# Patient Record
Sex: FEMALE | Race: WHITE | NOT HISPANIC OR LATINO | Employment: PART TIME | URBAN - METROPOLITAN AREA
[De-identification: names, ages, dates, MRNs, and addresses within clinical notes are randomized per-mention and may not be internally consistent; named-entity substitution may affect disease eponyms.]

---

## 2017-04-04 ENCOUNTER — TELEPHONE (OUTPATIENT)
Dept: URGENT CARE | Facility: CLINIC | Age: 62
End: 2017-04-04

## 2017-04-04 DIAGNOSIS — R05.9 COUGH: Primary | ICD-10-CM

## 2017-04-04 RX ORDER — DEXTROMETHORPHAN HYDROBROMIDE AND PROMETHAZINE HYDROCHLORIDE 15; 6.25 MG/5ML; MG/5ML
5 SYRUP ORAL 4 TIMES DAILY PRN
Qty: 60 ML | Refills: 0 | Status: SHIPPED | OUTPATIENT
Start: 2017-04-04 | End: 2018-03-31

## 2018-06-01 ENCOUNTER — TRANSCRIBE ORDERS (OUTPATIENT)
Dept: ADMINISTRATIVE | Facility: HOSPITAL | Age: 63
End: 2018-06-01

## 2018-06-01 DIAGNOSIS — Z78.0 POSTMENOPAUSAL: Primary | ICD-10-CM

## 2018-06-01 DIAGNOSIS — Z12.31 VISIT FOR SCREENING MAMMOGRAM: ICD-10-CM

## 2018-06-27 ENCOUNTER — OFFICE VISIT (OUTPATIENT)
Dept: PULMONOLOGY | Facility: CLINIC | Age: 63
End: 2018-06-27

## 2018-06-27 VITALS
DIASTOLIC BLOOD PRESSURE: 80 MMHG | SYSTOLIC BLOOD PRESSURE: 130 MMHG | BODY MASS INDEX: 44.56 KG/M2 | OXYGEN SATURATION: 96 % | HEART RATE: 90 BPM | TEMPERATURE: 98.4 F | WEIGHT: 236 LBS | HEIGHT: 61 IN

## 2018-06-27 DIAGNOSIS — R06.09 DYSPNEA ON EXERTION: ICD-10-CM

## 2018-06-27 DIAGNOSIS — J43.2 CENTRILOBULAR EMPHYSEMA (HCC): ICD-10-CM

## 2018-06-27 DIAGNOSIS — G47.33 OSA ON CPAP: ICD-10-CM

## 2018-06-27 DIAGNOSIS — R06.02 SOB (SHORTNESS OF BREATH): Primary | ICD-10-CM

## 2018-06-27 DIAGNOSIS — Z87.891 EX-SMOKER: ICD-10-CM

## 2018-06-27 DIAGNOSIS — Z99.89 OSA ON CPAP: ICD-10-CM

## 2018-06-27 DIAGNOSIS — R91.1 SOLITARY PULMONARY NODULE: ICD-10-CM

## 2018-06-27 PROBLEM — R06.00 DYSPNEA: Status: ACTIVE | Noted: 2018-05-23

## 2018-06-27 PROBLEM — J30.1 ALLERGIC RHINITIS DUE TO POLLEN: Status: ACTIVE | Noted: 2018-05-23

## 2018-06-27 PROCEDURE — 99244 OFF/OP CNSLTJ NEW/EST MOD 40: CPT | Performed by: INTERNAL MEDICINE

## 2018-06-27 PROCEDURE — 94729 DIFFUSING CAPACITY: CPT | Performed by: INTERNAL MEDICINE

## 2018-06-27 PROCEDURE — 94060 EVALUATION OF WHEEZING: CPT | Performed by: INTERNAL MEDICINE

## 2018-06-27 PROCEDURE — 94726 PLETHYSMOGRAPHY LUNG VOLUMES: CPT | Performed by: INTERNAL MEDICINE

## 2018-06-27 RX ORDER — FLUTICASONE PROPIONATE 50 MCG
SPRAY, SUSPENSION (ML) NASAL DAILY
COMMUNITY
Start: 2018-05-23 | End: 2019-03-22 | Stop reason: SDDI

## 2018-06-27 RX ORDER — IBUPROFEN 200 MG
TABLET ORAL
COMMUNITY
Start: 2018-05-23 | End: 2019-03-22 | Stop reason: SDDI

## 2018-06-27 RX ORDER — ALBUTEROL SULFATE 90 UG/1
4 AEROSOL, METERED RESPIRATORY (INHALATION) ONCE
Status: COMPLETED | OUTPATIENT
Start: 2018-06-27 | End: 2018-06-27

## 2018-06-27 RX ORDER — ALBUTEROL SULFATE 90 UG/1
AEROSOL, METERED RESPIRATORY (INHALATION)
COMMUNITY
Start: 2015-12-08 | End: 2018-06-27

## 2018-06-27 RX ADMIN — ALBUTEROL SULFATE 4 PUFF: 90 AEROSOL, METERED RESPIRATORY (INHALATION) at 08:20

## 2018-06-27 NOTE — PROGRESS NOTES
Sorts Subjective:     Chief Complaint:   Chief Complaint   Patient presents with   • Lung Nodule     per Dr Mendez       HPI:    Lizabeth Rangel is a 62 y.o. female seen in consultation at the request of Dr. Mendez for evaluation of a pulmonary nodule    She underwent a low dose screening CAT scan at [facility].  A 6 mm nodule was found in the lingula.  This was described as groundglass.  Some mild centrilobular emphysematous changes were also noted.    She has a remote smoking history.  She quit in 1989 was a heavy smoker prior to that.  She does have some mild chronic dyspnea on exertion.  She is trying to lose weight and exercise.  She does have obstructive sleep apnea and recently has resumed her CPAP therapy.  She has a download on her phone which shows a normal AHI and good mask fit.    Historical details:    General symptoms:  - intentional weight loss of 15 lbs    Exercise tolerance:  - shortness of breath at 3 flight(s) of stairs    Chest symptoms:  - mild cough  - cough is dry    Sputum:  - normal    Upper airway:  - chronic congestion    Reflux symptoms:  - no reflux symptoms    Recent imaging:  - CT of chest:  6 mm lingular nodule    Current medications are:   Current Outpatient Prescriptions:   •  fluticasone (FLONASE) 50 MCG/ACT nasal spray, into each nostril Daily., Disp: , Rfl:   •  ibuprofen (ADVIL,MOTRIN) 200 MG tablet, Take  by mouth., Disp: , Rfl:   No current facility-administered medications for this visit. .      The patient's relevant past medical, surgical, family and social history were reviewed and updated in Epic as appropriate.     ROS:    Review of Systems  ROS documented in patient questionnaire ×12 systems.  Reviewed with patient.  Otherwise negative except as noted in HPI.    Objective:    Physical Exam   Constitutional: She is oriented to person, place, and time. She appears well-developed and well-nourished.   HENT:   Head: Normocephalic and atraumatic.   Nose: Nose  normal.   Mouth/Throat: Oropharynx is clear and moist. No oropharyngeal exudate.   Class IV airway   Eyes: Conjunctivae are normal. No scleral icterus.   Neck: No tracheal deviation present. No thyromegaly present.   Cardiovascular: Normal rate and regular rhythm.  Exam reveals no gallop and no friction rub.    No murmur heard.  Pulmonary/Chest: Effort normal. No respiratory distress. She has no wheezes. She has no rales.   Musculoskeletal: She exhibits no edema or deformity.   Neurological: She is alert and oriented to person, place, and time.   Skin: Skin is warm and dry. No rash noted.   Psychiatric: She has a normal mood and affect. Her behavior is normal.   Nursing note and vitals reviewed.      Diagnostics:     PFT:  - mild airway obstruction  - normal lung volumes  - normal diffusion    CXR:  - none    CT as above    Assessment:    Problem List Items Addressed This Visit        Pulmonary Problems    CAREN on CPAP    Solitary pulmonary nodule    Overview     6 mm GG lingula 6/2018         Centrilobular emphysema    Dyspnea       Other    Ex-smoker      Other Visit Diagnoses     SOB (shortness of breath)    -  Primary    Relevant Medications    albuterol (PROVENTIL HFA;VENTOLIN HFA) inhaler 4 puff (Completed)    Other Relevant Orders    Pulmonary Function Test (Completed)          62-year-old female with a 6 mm groundglass nodule in the lingula which is an incidental finding.  Her risk of cancer is low.  Therefore by standard SPN criteria a follow-up CAT scan in 12 months would be recommended.  I discussed this with the patient in detail and showed her her CAT scan.  She understands the absolute necessity of follow-up to mitigate the low but potential risk of lung cancer    She has mild obstructive airways disease and mild centrilobular emphysema by CT scan.  No inhaled therapy needed as she is generally asymptomatic.  I think that weight loss and exercise would be important for her to improve her dyspnea on  exertion.    From the standpoint of her sleep apnea she is well treated based upon her download [email or web address]    Plan:     Follow-up CT scan in  1 year  As above.  Clinic follow-up  1 year      Signed by  Gary Hamilton MD

## 2018-07-03 ENCOUNTER — HOSPITAL ENCOUNTER (OUTPATIENT)
Dept: BONE DENSITY | Facility: HOSPITAL | Age: 63
Discharge: HOME | End: 2018-07-03

## 2018-07-03 ENCOUNTER — APPOINTMENT (OUTPATIENT)
Dept: OTHER | Facility: HOSPITAL | Age: 63
End: 2018-07-03

## 2018-07-03 ENCOUNTER — HOSPITAL ENCOUNTER (OUTPATIENT)
Dept: MAMMOGRAPHY | Facility: HOSPITAL | Age: 63
Discharge: HOME | End: 2018-07-03
Admitting: NURSE PRACTITIONER

## 2018-07-03 DIAGNOSIS — Z12.31 VISIT FOR SCREENING MAMMOGRAM: ICD-10-CM

## 2018-07-03 DIAGNOSIS — Z78.0 POSTMENOPAUSAL: ICD-10-CM

## 2018-07-03 PROCEDURE — 77063 BREAST TOMOSYNTHESIS BI: CPT

## 2018-07-03 PROCEDURE — 77067 SCR MAMMO BI INCL CAD: CPT

## 2018-07-03 PROCEDURE — 77063 BREAST TOMOSYNTHESIS BI: CPT | Performed by: RADIOLOGY

## 2018-07-03 PROCEDURE — 77067 SCR MAMMO BI INCL CAD: CPT | Performed by: RADIOLOGY

## 2018-07-03 PROCEDURE — 77080 DXA BONE DENSITY AXIAL: CPT

## 2018-07-23 ENCOUNTER — HOSPITAL ENCOUNTER (OUTPATIENT)
Dept: MAMMOGRAPHY | Facility: HOSPITAL | Age: 63
Discharge: HOME | End: 2018-07-23
Admitting: RADIOLOGY

## 2018-07-23 ENCOUNTER — HOSPITAL ENCOUNTER (OUTPATIENT)
Dept: ULTRASOUND IMAGING | Facility: HOSPITAL | Age: 63
Discharge: HOME | End: 2018-07-23

## 2018-07-23 DIAGNOSIS — R92.8 ABNORMAL MAMMOGRAM: ICD-10-CM

## 2018-07-23 PROCEDURE — G0279 TOMOSYNTHESIS, MAMMO: HCPCS

## 2018-07-23 PROCEDURE — 77065 DX MAMMO INCL CAD UNI: CPT

## 2018-07-23 PROCEDURE — 76642 ULTRASOUND BREAST LIMITED: CPT | Performed by: RADIOLOGY

## 2018-07-23 PROCEDURE — 77061 BREAST TOMOSYNTHESIS UNI: CPT | Performed by: RADIOLOGY

## 2018-07-23 PROCEDURE — 76642 ULTRASOUND BREAST LIMITED: CPT

## 2018-07-23 PROCEDURE — 77065 DX MAMMO INCL CAD UNI: CPT | Performed by: RADIOLOGY

## 2018-11-08 ENCOUNTER — TRANSCRIBE ORDERS (OUTPATIENT)
Dept: ADMINISTRATIVE | Facility: HOSPITAL | Age: 63
End: 2018-11-08

## 2018-11-08 DIAGNOSIS — R10.9 ABDOMINAL PAIN, UNSPECIFIED ABDOMINAL LOCATION: Primary | ICD-10-CM

## 2018-11-15 ENCOUNTER — HOSPITAL ENCOUNTER (OUTPATIENT)
Dept: MRI IMAGING | Facility: HOSPITAL | Age: 63
End: 2018-11-15

## 2018-11-26 ENCOUNTER — APPOINTMENT (OUTPATIENT)
Dept: MRI IMAGING | Facility: HOSPITAL | Age: 63
End: 2018-11-26

## 2018-12-10 ENCOUNTER — APPOINTMENT (OUTPATIENT)
Dept: MRI IMAGING | Facility: HOSPITAL | Age: 63
End: 2018-12-10

## 2018-12-18 ENCOUNTER — APPOINTMENT (OUTPATIENT)
Dept: MRI IMAGING | Facility: HOSPITAL | Age: 63
End: 2018-12-18

## 2018-12-19 ENCOUNTER — HOSPITAL ENCOUNTER (OUTPATIENT)
Dept: MRI IMAGING | Facility: HOSPITAL | Age: 63
Discharge: HOME | End: 2018-12-19
Admitting: INTERNAL MEDICINE

## 2018-12-19 DIAGNOSIS — R10.9 ABDOMINAL PAIN, UNSPECIFIED ABDOMINAL LOCATION: ICD-10-CM

## 2018-12-19 LAB — CREAT BLDA-MCNC: 0.7 MG/DL (ref 0.6–1.3)

## 2018-12-19 PROCEDURE — 74183 MRI ABD W/O CNTR FLWD CNTR: CPT

## 2018-12-19 PROCEDURE — 82565 ASSAY OF CREATININE: CPT

## 2018-12-19 PROCEDURE — 0 GADOBENATE DIMEGLUMINE 529 MG/ML SOLUTION: Performed by: INTERNAL MEDICINE

## 2018-12-19 PROCEDURE — A9577 INJ MULTIHANCE: HCPCS | Performed by: INTERNAL MEDICINE

## 2018-12-19 RX ADMIN — GADOBENATE DIMEGLUMINE 15 ML: 529 INJECTION, SOLUTION INTRAVENOUS at 09:06

## 2019-03-22 ENCOUNTER — OFFICE VISIT (OUTPATIENT)
Dept: INTERNAL MEDICINE | Facility: CLINIC | Age: 64
End: 2019-03-22

## 2019-03-22 VITALS
BODY MASS INDEX: 46.07 KG/M2 | DIASTOLIC BLOOD PRESSURE: 68 MMHG | WEIGHT: 244 LBS | SYSTOLIC BLOOD PRESSURE: 112 MMHG | HEART RATE: 104 BPM | TEMPERATURE: 99 F | HEIGHT: 61 IN

## 2019-03-22 DIAGNOSIS — R50.9 FEVER, UNSPECIFIED FEVER CAUSE: Primary | ICD-10-CM

## 2019-03-22 DIAGNOSIS — J06.9 ACUTE URI: ICD-10-CM

## 2019-03-22 LAB
EXPIRATION DATE: NORMAL
EXPIRATION DATE: NORMAL
FLUAV AG NPH QL: NEGATIVE
FLUBV AG NPH QL: NEGATIVE
INTERNAL CONTROL: NORMAL
INTERNAL CONTROL: NORMAL
Lab: NORMAL
Lab: NORMAL
S PYO AG THROAT QL: NEGATIVE

## 2019-03-22 PROCEDURE — 99214 OFFICE O/P EST MOD 30 MIN: CPT | Performed by: NURSE PRACTITIONER

## 2019-03-22 PROCEDURE — 87880 STREP A ASSAY W/OPTIC: CPT | Performed by: NURSE PRACTITIONER

## 2019-03-22 PROCEDURE — 87804 INFLUENZA ASSAY W/OPTIC: CPT | Performed by: NURSE PRACTITIONER

## 2019-03-22 RX ORDER — PROMETHAZINE HYDROCHLORIDE AND CODEINE PHOSPHATE 6.25; 1 MG/5ML; MG/5ML
5 SYRUP ORAL EVERY 4 HOURS PRN
Qty: 118 ML | Refills: 0 | Status: SHIPPED | OUTPATIENT
Start: 2019-03-22 | End: 2019-03-29 | Stop reason: SDUPTHER

## 2019-03-22 RX ORDER — ALBUTEROL SULFATE 90 UG/1
2 AEROSOL, METERED RESPIRATORY (INHALATION) EVERY 4 HOURS PRN
Qty: 1 INHALER | Refills: 0 | Status: SHIPPED | OUTPATIENT
Start: 2019-03-22 | End: 2019-03-29

## 2019-03-22 RX ORDER — PROMETHAZINE HYDROCHLORIDE AND PHENYLEPHRINE HYDROCHLORIDE 6.25; 5 MG/5ML; MG/5ML
5 SYRUP ORAL EVERY 4 HOURS PRN
Qty: 118 ML | Refills: 0 | Status: SHIPPED | OUTPATIENT
Start: 2019-03-22 | End: 2019-03-22 | Stop reason: ALTCHOICE

## 2019-03-22 RX ORDER — AZITHROMYCIN 250 MG/1
TABLET, FILM COATED ORAL
Qty: 6 TABLET | Refills: 0 | Status: SHIPPED | OUTPATIENT
Start: 2019-03-22 | End: 2019-03-29

## 2019-03-22 NOTE — PROGRESS NOTES
Lizabeth Rangel  1955  [id number]  Patient Care Team:  Jean Mendez MD as PCP - General (Internal Medicine)    Lizabeth Rangel is a pleasant 63 y.o. female who presents for evaluation of Sore Throat (x 1 day ) and Cough (productive cough )      Chief Complaint   Patient presents with   • Sore Throat     x 1 day    • Cough     productive cough        HPI:   1 day hx productive cough, fever, sore throat.  Did take Advil 600 mg last night, up all night coughing.  Also has hx of recurrent strep.  Office mates ill last week.  No meds today.  Reports wheezing last night but none today.    Past Medical History:   Diagnosis Date   • Sleep apnea        Past Surgical History:   Procedure Laterality Date   • ANKLE ARTHROSCOPY     • CATARACT EXTRACTION     • CHOLECYSTECTOMY     • COLON RESECTION         Family History   Problem Relation Age of Onset   • No Known Problems Mother    • Alcohol abuse Father    • Breast cancer Maternal Aunt 30   • Ovarian cancer Neg Hx        Social History     Tobacco Use   Smoking Status Former Smoker   • Last attempt to quit: 1989   • Years since quittin.7   Smokeless Tobacco Never Used       Allergies   Allergen Reactions   • Thimerosal Itching       Review of Systems   Constitutional: Positive for chills. Negative for fatigue and fever.   HENT: Positive for congestion. Negative for ear pain and sinus pressure.    Respiratory: Positive for cough and wheezing. Negative for chest tightness and shortness of breath.    Cardiovascular: Negative for chest pain and palpitations.   Gastrointestinal: Negative for abdominal pain, blood in stool and constipation.   Skin: Negative for color change.   Allergic/Immunologic: Negative for environmental allergies.   Neurological: Negative for dizziness, speech difficulty and headache.   Psychiatric/Behavioral: Negative for decreased concentration. The patient is not nervous/anxious.        Vitals:    19 1504   BP: 112/68   BP Location:  "Left arm   Patient Position: Sitting   Cuff Size: Large Adult   Pulse: 104   Temp: 99 °F (37.2 °C)   TempSrc: Temporal   Weight: 111 kg (244 lb)   Height: 154.9 cm (60.98\")   PainSc:   9   PainLoc: Throat         Current Outpatient Medications:   •  albuterol sulfate  (90 Base) MCG/ACT inhaler, Inhale 2 puffs Every 4 (Four) Hours As Needed for Wheezing., Disp: 1 inhaler, Rfl: 0  •  azithromycin (ZITHROMAX Z-BULL) 250 MG tablet, Take 2 tablets the first day, then 1 tablet daily for 4 days., Disp: 6 tablet, Rfl: 0  •  promethazine-codeine (PHENERGAN with CODEINE) 6.25-10 MG/5ML syrup, Take 5 mL by mouth Every 4 (Four) Hours As Needed for Cough., Disp: 118 mL, Rfl: 0    Physical Exam   Constitutional: She appears well-developed and well-nourished.   HENT:   Head: Normocephalic and atraumatic.   Right Ear: External ear normal.   Left Ear: External ear normal.   Mouth/Throat: Oropharynx is clear and moist.   Eyes: Conjunctivae and EOM are normal.   Neck: Normal range of motion. Neck supple.   Cardiovascular: Normal rate, regular rhythm and normal heart sounds.   Pulmonary/Chest: Effort normal and breath sounds normal.   Abdominal: Soft. Bowel sounds are normal.   Musculoskeletal: Normal range of motion.   Lymphadenopathy:     She has no cervical adenopathy.   Neurological: She is alert.   Skin: Skin is warm and dry.   Psychiatric: She has a normal mood and affect. Her behavior is normal. Thought content normal.       Results Review:    None    Procedures    Assessment/Plan:    Problem List Items Addressed This Visit     None      Visit Diagnoses     Fever, unspecified fever cause    -  Primary    Relevant Orders    POC Rapid Strep A (Completed)    POC Influenza A / B (Completed)    Acute URI        Relevant Medications    azithromycin (ZITHROMAX Z-BULL) 250 MG tablet    albuterol sulfate  (90 Base) MCG/ACT inhaler    promethazine-codeine (PHENERGAN with CODEINE) 6.25-10 MG/5ML syrup          Plan of care " reviewed with patient at the conclusion of today's visit. Education was provided regarding diagnosis, management and any prescribed or recommended OTC medications.  Patient verbalizes understanding of and agreement with management plan.    Return if symptoms worsen or fail to improve.    *Note that portions of this note were completed with a voice recognition program.  Efforts were made to edit the dictation but occasionally words are transcribed.    VALDEZ Rai

## 2019-03-22 NOTE — PATIENT INSTRUCTIONS
zpack to pharmacy if not better or if worse in 2-3 days    Use albuterol 2 puffs every 4-6 hours as needed for wheezing, shortness of breath, chest tightness or excessive coughing.    RX cough syrup to pharmacy

## 2019-03-25 PROBLEM — R91.1 LUNG NODULE: Status: ACTIVE | Noted: 2019-03-25

## 2019-03-25 PROBLEM — L98.9 SKIN LESION OF SCALP: Status: ACTIVE | Noted: 2019-03-25

## 2019-03-25 PROBLEM — N60.09 BREAST CYST: Status: ACTIVE | Noted: 2019-03-25

## 2019-03-25 PROBLEM — E78.2 MIXED HYPERLIPIDEMIA: Status: ACTIVE | Noted: 2019-03-25

## 2019-03-25 PROBLEM — E53.8 B12 DEFICIENCY: Status: ACTIVE | Noted: 2019-03-25

## 2019-03-25 PROBLEM — Z78.0 POST-MENOPAUSAL: Status: ACTIVE | Noted: 2019-03-25

## 2019-03-25 PROBLEM — M85.80 OSTEOPENIA: Status: ACTIVE | Noted: 2019-03-25

## 2019-03-25 PROBLEM — E55.9 VITAMIN D DEFICIENCY: Status: ACTIVE | Noted: 2019-03-25

## 2019-03-29 ENCOUNTER — HOSPITAL ENCOUNTER (OUTPATIENT)
Dept: GENERAL RADIOLOGY | Facility: HOSPITAL | Age: 64
Discharge: HOME | End: 2019-03-29
Admitting: NURSE PRACTITIONER

## 2019-03-29 ENCOUNTER — OFFICE VISIT (OUTPATIENT)
Dept: INTERNAL MEDICINE | Facility: CLINIC | Age: 64
End: 2019-03-29

## 2019-03-29 VITALS
DIASTOLIC BLOOD PRESSURE: 74 MMHG | HEIGHT: 61 IN | BODY MASS INDEX: 45.88 KG/M2 | HEART RATE: 88 BPM | WEIGHT: 243 LBS | TEMPERATURE: 97.6 F | SYSTOLIC BLOOD PRESSURE: 114 MMHG

## 2019-03-29 DIAGNOSIS — J06.9 ACUTE URI: ICD-10-CM

## 2019-03-29 DIAGNOSIS — R05.9 COUGH: ICD-10-CM

## 2019-03-29 DIAGNOSIS — R07.81 RIB PAIN ON RIGHT SIDE: ICD-10-CM

## 2019-03-29 DIAGNOSIS — R07.81 RIB PAIN ON RIGHT SIDE: Primary | ICD-10-CM

## 2019-03-29 PROBLEM — E66.01 MORBID OBESITY WITH BMI OF 45.0-49.9, ADULT (HCC): Status: ACTIVE | Noted: 2019-03-29

## 2019-03-29 PROCEDURE — 71046 X-RAY EXAM CHEST 2 VIEWS: CPT

## 2019-03-29 PROCEDURE — 99213 OFFICE O/P EST LOW 20 MIN: CPT | Performed by: NURSE PRACTITIONER

## 2019-03-29 RX ORDER — PROMETHAZINE HYDROCHLORIDE AND CODEINE PHOSPHATE 6.25; 1 MG/5ML; MG/5ML
5 SYRUP ORAL EVERY 4 HOURS PRN
Qty: 118 ML | Refills: 0 | Status: SHIPPED | OUTPATIENT
Start: 2019-03-29 | End: 2019-03-29

## 2019-03-29 NOTE — PATIENT INSTRUCTIONS
Chest Wall Pain  Chest wall pain is pain in or around the bones and muscles of your chest. Sometimes, an injury causes this pain. Sometimes, the cause may not be known. This pain may take several weeks or longer to get better.  Follow these instructions at home:  Pay attention to any changes in your symptoms. Take these actions to help with your pain:  · Rest as told by your doctor.  · Avoid activities that cause pain. Try not to use your chest, belly (abdominal), or side muscles to lift heavy things.  · If directed, apply ice to the painful area:  ? Put ice in a plastic bag.  ? Place a towel between your skin and the bag.  ? Leave the ice on for 20 minutes, 2-3 times per day.  · Take over-the-counter and prescription medicines only as told by your doctor.  · Do not use tobacco products, including cigarettes, chewing tobacco, and e-cigarettes. If you need help quitting, ask your doctor.  · Keep all follow-up visits as told by your doctor. This is important.    Contact a doctor if:  · You have a fever.  · Your chest pain gets worse.  · You have new symptoms.  Get help right away if:  · You feel sick to your stomach (nauseous) or you throw up (vomit).  · You feel sweaty or light-headed.  · You have a cough with phlegm (sputum) or you cough up blood.  · You are short of breath.  This information is not intended to replace advice given to you by your health care provider. Make sure you discuss any questions you have with your health care provider.  Document Released: 06/05/2009 Document Revised: 05/25/2017 Document Reviewed: 03/14/2016  [employer] Interactive Patient Education © 2019 [employer]

## 2019-03-29 NOTE — PROGRESS NOTES
"Lizabeth Rangel  1955  [id number]  Patient Care Team:  Holli Kaur APRN as PCP - General (Internal Medicine)    Lizabeth Rangel is a pleasant 63 y.o. female who presents for evaluation of Follow-up      Chief Complaint   Patient presents with   • Follow-up       HPI:     Ms. Rangel is a pleasant 63 year old female who presents today after URI diagnosis and states that this past Saturday night she was coughing and felt something \"pop\" on the right lower side of her ribs.  She states that she has been having some pain on the right side and states she has been having difficulty with attempting to cough, and has even been utilizing the wall for splinting with coughing.  She states that today the pain seems to be a little bit better.  Her other symptoms with wheezing, congestion and ear pain have improved since the last visit, however st.  She states she is finished with her antibiotics and she is still using her Albuterol MDI about 3 times daily PRN.  She denies any c/o CP or SOA.       Past Medical History:   Diagnosis Date   • Allergic rhinitis    • Ankle fracture     and  - left; surg rerpair    • Gallstone pancreatitis    • Perforated bowel (CMS/HCC)    • Sleep apnea        Past Surgical History:   Procedure Laterality Date   • ANKLE ARTHROSCOPY     • CATARACT EXTRACTION     • CATARACT EXTRACTION     • CHOLECYSTECTOMY     • COLON RESECTION         Family History   Problem Relation Age of Onset   • Obesity Mother    • Alcohol abuse Father    • Obesity Father    • Breast cancer Maternal Aunt 30   • Ovarian cancer Neg Hx        Social History     Tobacco Use   Smoking Status Former Smoker   • Types: Cigarettes   • Last attempt to quit: 1989   • Years since quittin.7   Smokeless Tobacco Never Used       Allergies   Allergen Reactions   • Thimerosal Itching     Itchy red eyes        Review of Systems   Constitutional: Negative for chills, fatigue and fever.   HENT: " "Positive for congestion and sore throat. Negative for ear pain and sinus pressure.    Respiratory: Positive for cough. Negative for chest tightness, shortness of breath and wheezing.    Cardiovascular: Negative for chest pain and palpitations.   Gastrointestinal: Negative for abdominal pain, blood in stool and constipation.   Skin: Negative for color change.   Allergic/Immunologic: Negative for environmental allergies.   Neurological: Negative for dizziness, speech difficulty and headache.   Psychiatric/Behavioral: Negative for decreased concentration. The patient is not nervous/anxious.        Vitals:    03/29/19 1055   BP: 114/74   BP Location: Left arm   Patient Position: Sitting   Cuff Size: Adult   Pulse: 88   Temp: 97.6 °F (36.4 °C)   TempSrc: Temporal   Weight: 110 kg (243 lb)   Height: 154.9 cm (60.98\")   PainSc: 0-No pain       No current outpatient medications on file.    Physical Exam   Constitutional: She appears well-developed and well-nourished.   HENT:   Head: Normocephalic and atraumatic.   Right Ear: External ear normal.   Left Ear: External ear normal.   Mouth/Throat: Oropharynx is clear and moist.   Eyes: Conjunctivae and EOM are normal.   Neck: Normal range of motion. Neck supple.   Cardiovascular: Normal rate, regular rhythm and normal heart sounds.   Pulmonary/Chest: Effort normal and breath sounds normal. No respiratory distress. She has no wheezes. She has no rhonchi. She has no rales.   Abdominal: Soft. Bowel sounds are normal.   Musculoskeletal: Normal range of motion.   Lymphadenopathy:     She has no cervical adenopathy.   Neurological: She is alert.   Skin: Skin is warm and dry.   Psychiatric: She has a normal mood and affect. Her behavior is normal. Thought content normal.       Results Review:    None    Procedures    Assessment/Plan:    Problem List Items Addressed This Visit     None      Visit Diagnoses     Rib pain on right side    -  Primary    Relevant Orders    XR Chest PA & " Lateral (Completed)    Acute URI        Cough          Use albuterol 2 puffs every 4-6 hours as needed for wheezing, shortness of breath, chest tightness or excessive coughing.  This patient is on a controlled substance which improves symptoms/quality of life and is aware of the risks, benefits and possible side-effects current treatment. The patient denies any medication side-effects at this time. A controlled substance agreement will be obtained or is currently on file. We reviewed required monitoring for controlled substances including but not limited to quarterly follow-up visits, annual depression screening, and urine drug screens to which the patient is agreeable. A GERARD report has been or shortly will be reviewed. There are no signs of deviation or misuse.         Plan of care reviewed with patient at the conclusion of today's visit. Education was provided regarding diagnosis, management and any prescribed or recommended OTC medications.  Patient verbalizes understanding of and agreement with management plan.    Patient sent from office for chest xray.  Will update patient on results when received.  Patient given refill for Phenergan with codeine for continued cough.   Return if symptoms worsen or fail to improve.    *Note that portions of this note were completed with a voice recognition program.  Efforts were made to edit the dictation but occasionally words are transcribed.    VALDEZ Rai

## 2019-04-03 DIAGNOSIS — R92.8 ABNORMAL MAMMOGRAM: ICD-10-CM

## 2019-04-03 DIAGNOSIS — I27.20 PULMONARY HYPERTENSION (HCC): Primary | ICD-10-CM

## 2019-04-10 ENCOUNTER — OFFICE VISIT (OUTPATIENT)
Dept: CARDIOLOGY | Facility: HOSPITAL | Age: 64
End: 2019-04-10

## 2019-04-10 VITALS
WEIGHT: 242.5 LBS | DIASTOLIC BLOOD PRESSURE: 74 MMHG | SYSTOLIC BLOOD PRESSURE: 126 MMHG | BODY MASS INDEX: 45.79 KG/M2 | HEART RATE: 78 BPM | TEMPERATURE: 96.6 F | HEIGHT: 61 IN | OXYGEN SATURATION: 97 % | RESPIRATION RATE: 18 BRPM

## 2019-04-10 DIAGNOSIS — J43.2 CENTRILOBULAR EMPHYSEMA (HCC): ICD-10-CM

## 2019-04-10 DIAGNOSIS — G47.33 OSA ON CPAP: ICD-10-CM

## 2019-04-10 DIAGNOSIS — Z99.89 OSA ON CPAP: ICD-10-CM

## 2019-04-10 DIAGNOSIS — I27.20 PULMONARY HYPERTENSION (HCC): Primary | ICD-10-CM

## 2019-04-10 PROCEDURE — 99213 OFFICE O/P EST LOW 20 MIN: CPT | Performed by: NURSE PRACTITIONER

## 2019-04-10 NOTE — PROGRESS NOTES
Encounter Date:04/10/2019      Patient ID: Lizabeth Rangel is a 63 y.o. female.        Subjective:     Chief Complaint: Establish Care (Pulmonary Hypertension)     History of Present Illness 63 year old female with history of emphysema and CAREN presents today for ongoing evaluation of her pulmonary hypertension as noted on recent xray. Patient recently had an URI and had a chest xray that showed mild pulmonary hypertension. Patient is followed by Dr Perez for a lung nodule as well as her emphysema. She notes that she only experiences dyspnea with walking up an incline and notes that has been present for the past 25 years. She notes that dyspnea when walking on an incline has not worsened recently. She has a hx of CAREN and is compliant with CPAP. Denies cp, palpitations, presyncope, orthopnea,pnd, abdominal fullness, pedal edema.     Patient Active Problem List   Diagnosis   • Allergic rhinitis due to pollen   • Dyspnea   • Ex-smoker   • CAREN on CPAP   • Solitary pulmonary nodule   • Centrilobular emphysema (CMS/HCC)   • Skin lesion of scalp   • Post-menopausal   • Vitamin D deficiency   • B12 deficiency   • Mixed hyperlipidemia   • Lung nodule   • Breast cyst   • Osteopenia   • Morbid obesity with BMI of 45.0-49.9, adult (CMS/HCC)   • Pulmonary hypertension (CMS/HCC)   • Abnormal mammogram       Past Surgical History:   Procedure Laterality Date   • ANKLE ARTHROSCOPY  1992   • CATARACT EXTRACTION  2018   • CATARACT EXTRACTION  2017   • CHOLECYSTECTOMY  1997   • COLON RESECTION         Allergies   Allergen Reactions   • Thimerosal Itching     Itchy red eyes        No current outpatient medications on file.    The following portions of the chart were reviewed today and updated as appropriate: Allergies, current medications, past family history, social history, past medical history.     Review of Systems   Constitution: Negative for chills, decreased appetite, diaphoresis, fever, weakness, malaise/fatigue, night  "sweats, weight gain and weight loss.   HENT: Negative for congestion, hearing loss, hoarse voice and nosebleeds.    Eyes: Negative for blurred vision, visual disturbance and visual halos.   Cardiovascular: Positive for dyspnea on exertion (on when walking on an incline). Negative for chest pain, claudication, cyanosis, irregular heartbeat, leg swelling, near-syncope, orthopnea, palpitations, paroxysmal nocturnal dyspnea and syncope.   Respiratory: Positive for snoring (uses cpap). Negative for cough, hemoptysis, shortness of breath, sleep disturbances due to breathing, sputum production and wheezing.    Endocrine: Positive for heat intolerance.   Hematologic/Lymphatic: Negative for bleeding problem. Does not bruise/bleed easily.   Skin: Negative for dry skin, itching and rash.   Musculoskeletal: Negative for arthritis, falls, joint pain, joint swelling and myalgias.   Gastrointestinal: Negative for bloating, abdominal pain, constipation, diarrhea, flatus, heartburn, hematemesis, hematochezia, melena, nausea and vomiting.   Genitourinary: Negative for dysuria, frequency, hematuria, nocturia and urgency.   Neurological: Negative for excessive daytime sleepiness, dizziness, headaches, light-headedness and loss of balance.   Psychiatric/Behavioral: Negative for depression. The patient does not have insomnia and is not nervous/anxious.            Objective:     Vitals:    04/10/19 1035 04/10/19 1038 04/10/19 1039   BP: 129/68 130/70 126/74   BP Location: Right arm Left arm Right arm   Patient Position: Sitting Sitting Standing   Cuff Size: Large Adult Large Adult Large Adult   Pulse: 69 68 78   Resp: 18     Temp: 96.6 °F (35.9 °C)     TempSrc: Temporal     SpO2: 97% 97% 97%   Weight: 110 kg (242 lb 8 oz)     Height: 154.9 cm (61\")           Physical Exam   Constitutional: She is oriented to person, place, and time. She appears well-developed and well-nourished. She is active and cooperative. No distress.   HENT:   Head: " Normocephalic and atraumatic.   Mouth/Throat: Oropharynx is clear and moist.   Eyes: Conjunctivae and EOM are normal. Pupils are equal, round, and reactive to light.   Neck: Normal range of motion. Neck supple. No JVD present. No tracheal deviation present. No thyromegaly present.   Cardiovascular: Normal rate, regular rhythm, normal heart sounds and intact distal pulses.   Pulmonary/Chest: Effort normal and breath sounds normal.   Abdominal: Soft. Bowel sounds are normal. She exhibits no distension. There is no tenderness.   Musculoskeletal: Normal range of motion.   Neurological: She is alert and oriented to person, place, and time.   Skin: Skin is warm, dry and intact.   Psychiatric: She has a normal mood and affect. Her behavior is normal.   Nursing note and vitals reviewed.      Lab and Diagnostic Review:      Lab Results   Component Value Date    CREATININE 0.70 12/19/2018     Xray: Mild pulmonary venous hypertension. No evidence of active  chest disease.    Assessment and Plan:         1. Pulmonary hypertension (CMS/HCC)    - Adult Transthoracic Echo Complete W/ Cont if Necessary Per Protocol; Future  Ongoing plan of care after echo  2. Centrilobular emphysema (CMS/HCC)  Followed by Dr Ana cannon    3. CAREN on CPAP  Compliant with cpap     It has been a pleasure to participate in the care of this patient.  Patient was instructed to call the [facility] with any questions, concerns, or worsening symptoms.    * Please note that portions of this note were completed with a voice recognition program. Efforts were made to edit the dictation but occasionally words are transcribed.

## 2019-05-08 ENCOUNTER — HOSPITAL ENCOUNTER (OUTPATIENT)
Dept: CARDIOLOGY | Facility: HOSPITAL | Age: 64
Discharge: HOME | End: 2019-05-08
Admitting: NURSE PRACTITIONER

## 2019-05-08 VITALS — WEIGHT: 242 LBS | HEIGHT: 61 IN | BODY MASS INDEX: 45.69 KG/M2

## 2019-05-08 DIAGNOSIS — I27.20 PULMONARY HYPERTENSION (HCC): ICD-10-CM

## 2019-05-08 LAB
BH CV ECHO MEAS - AO ROOT AREA (BSA CORRECTED): 1.6
BH CV ECHO MEAS - AO ROOT AREA: 8 CM^2
BH CV ECHO MEAS - AO ROOT DIAM: 3.2 CM
BH CV ECHO MEAS - BSA(HAYCOCK): 2.2 M^2
BH CV ECHO MEAS - BSA: 2 M^2
BH CV ECHO MEAS - BZI_BMI: 45.7 KILOGRAMS/M^2
BH CV ECHO MEAS - BZI_METRIC_HEIGHT: 154.9 CM
BH CV ECHO MEAS - BZI_METRIC_WEIGHT: 109.8 KG
BH CV ECHO MEAS - EDV(CUBED): 91.1 ML
BH CV ECHO MEAS - EDV(MOD-SP2): 52 ML
BH CV ECHO MEAS - EDV(MOD-SP4): 50 ML
BH CV ECHO MEAS - EDV(TEICH): 92.4 ML
BH CV ECHO MEAS - EF(CUBED): 74.3 %
BH CV ECHO MEAS - EF(MOD-BP): 67 %
BH CV ECHO MEAS - EF(MOD-SP2): 69.2 %
BH CV ECHO MEAS - EF(MOD-SP4): 66 %
BH CV ECHO MEAS - EF(TEICH): 66.3 %
BH CV ECHO MEAS - ESV(CUBED): 23.4 ML
BH CV ECHO MEAS - ESV(MOD-SP2): 16 ML
BH CV ECHO MEAS - ESV(MOD-SP4): 17 ML
BH CV ECHO MEAS - ESV(TEICH): 31.1 ML
BH CV ECHO MEAS - FS: 36.4 %
BH CV ECHO MEAS - IVS/LVPW: 0.86
BH CV ECHO MEAS - IVSD: 0.91 CM
BH CV ECHO MEAS - LA DIMENSION: 3.3 CM
BH CV ECHO MEAS - LA/AO: 1
BH CV ECHO MEAS - LAD MAJOR: 4.6 CM
BH CV ECHO MEAS - LAT PEAK E' VEL: 7.8 CM/SEC
BH CV ECHO MEAS - LATERAL E/E' RATIO: 11.9
BH CV ECHO MEAS - LV DIASTOLIC VOL/BSA (35-75): 24.4 ML/M^2
BH CV ECHO MEAS - LV MASS(C)D: 149.8 GRAMS
BH CV ECHO MEAS - LV MASS(C)DI: 73.1 GRAMS/M^2
BH CV ECHO MEAS - LV SYSTOLIC VOL/BSA (12-30): 8.3 ML/M^2
BH CV ECHO MEAS - LVIDD: 4.5 CM
BH CV ECHO MEAS - LVIDS: 2.9 CM
BH CV ECHO MEAS - LVLD AP2: 6.7 CM
BH CV ECHO MEAS - LVLD AP4: 6.8 CM
BH CV ECHO MEAS - LVLS AP2: 5.3 CM
BH CV ECHO MEAS - LVLS AP4: 5.7 CM
BH CV ECHO MEAS - LVPWD: 1.1 CM
BH CV ECHO MEAS - MED PEAK E' VEL: 10.5 CM/SEC
BH CV ECHO MEAS - MEDIAL E/E' RATIO: 8.8
BH CV ECHO MEAS - MV A MAX VEL: 67.6 CM/SEC
BH CV ECHO MEAS - MV DEC TIME: 0.18 SEC
BH CV ECHO MEAS - MV E MAX VEL: 92.8 CM/SEC
BH CV ECHO MEAS - MV E/A: 1.4
BH CV ECHO MEAS - PA ACC SLOPE: 548 CM/SEC^2
BH CV ECHO MEAS - PA ACC TIME: 0.16 SEC
BH CV ECHO MEAS - PA PR(ACCEL): 7.9 MMHG
BH CV ECHO MEAS - RAP SYSTOLE: 3 MMHG
BH CV ECHO MEAS - RVSP: 26 MMHG
BH CV ECHO MEAS - SI(CUBED): 33.1 ML/M^2
BH CV ECHO MEAS - SI(MOD-SP2): 17.6 ML/M^2
BH CV ECHO MEAS - SI(MOD-SP4): 16.1 ML/M^2
BH CV ECHO MEAS - SI(TEICH): 29.9 ML/M^2
BH CV ECHO MEAS - SV(CUBED): 67.7 ML
BH CV ECHO MEAS - SV(MOD-SP2): 36 ML
BH CV ECHO MEAS - SV(MOD-SP4): 33 ML
BH CV ECHO MEAS - SV(TEICH): 61.3 ML
BH CV ECHO MEAS - TAPSE (>1.6): 2.1 CM2
BH CV ECHO MEAS - TR MAX PG: 23 MMHG
BH CV ECHO MEAS - TR MAX VEL: 237.5 CM/SEC
BH CV ECHO MEASUREMENTS AVERAGE E/E' RATIO: 10.14
BH CV VAS BP RIGHT ARM: NORMAL MMHG
BH CV XLRA - RV BASE: 2.6 CM
BH CV XLRA - RV LENGTH: 6.2 CM
BH CV XLRA - RV MID: 2.1 CM
BH CV XLRA - TDI S': 13.6 CM/SEC
LEFT ATRIUM VOLUME INDEX: 19 ML/M^2
LEFT ATRIUM VOLUME: 39 ML
LV EF 2D ECHO EST: 60 %
MAXIMAL PREDICTED HEART RATE: 156 BPM
STRESS TARGET HR: 133 BPM

## 2019-05-08 PROCEDURE — 93306 TTE W/DOPPLER COMPLETE: CPT | Performed by: INTERNAL MEDICINE

## 2019-05-08 PROCEDURE — 93306 TTE W/DOPPLER COMPLETE: CPT

## 2019-05-10 ENCOUNTER — PATIENT MESSAGE (OUTPATIENT)
Dept: CARDIOLOGY | Facility: HOSPITAL | Age: 64
End: 2019-05-10

## 2019-06-21 DIAGNOSIS — R91.1 SOLITARY PULMONARY NODULE: ICD-10-CM

## 2019-06-27 ENCOUNTER — HOSPITAL ENCOUNTER (OUTPATIENT)
Dept: MAMMOGRAPHY | Facility: HOSPITAL | Age: 64
Discharge: HOME | End: 2019-06-27
Admitting: NURSE PRACTITIONER

## 2019-06-27 DIAGNOSIS — R92.8 ABNORMAL MAMMOGRAM: ICD-10-CM

## 2019-06-27 PROCEDURE — 77062 BREAST TOMOSYNTHESIS BI: CPT | Performed by: RADIOLOGY

## 2019-06-27 PROCEDURE — 77066 DX MAMMO INCL CAD BI: CPT

## 2019-06-27 PROCEDURE — G0279 TOMOSYNTHESIS, MAMMO: HCPCS

## 2019-06-27 PROCEDURE — 77066 DX MAMMO INCL CAD BI: CPT | Performed by: RADIOLOGY

## 2019-07-29 ENCOUNTER — TELEPHONE (OUTPATIENT)
Dept: INTERNAL MEDICINE | Facility: CLINIC | Age: 64
End: 2019-07-29

## 2019-07-29 ENCOUNTER — DOCUMENTATION (OUTPATIENT)
Dept: INTERNAL MEDICINE | Facility: CLINIC | Age: 64
End: 2019-07-29

## 2019-07-29 NOTE — TELEPHONE ENCOUNTER
She has me to take a look at a CT scan she had done on 6/19/2019.  I do not see it in our chart can someone call and ask her where she had that done and we can get a copy of it please

## 2019-08-05 ENCOUNTER — DOCUMENTATION (OUTPATIENT)
Dept: INTERNAL MEDICINE | Facility: CLINIC | Age: 64
End: 2019-08-05

## 2019-08-05 DIAGNOSIS — E53.8 B12 DEFICIENCY: ICD-10-CM

## 2019-08-05 DIAGNOSIS — E55.9 VITAMIN D DEFICIENCY: ICD-10-CM

## 2019-08-05 DIAGNOSIS — E78.2 MIXED HYPERLIPIDEMIA: Primary | ICD-10-CM

## 2019-08-05 DIAGNOSIS — Z11.59 ENCOUNTER FOR HEPATITIS C SCREENING TEST FOR LOW RISK PATIENT: ICD-10-CM

## 2019-08-05 PROBLEM — I70.0 AORTIC ATHEROSCLEROSIS: Status: ACTIVE | Noted: 2019-08-05

## 2019-08-05 PROBLEM — M47.814 SPONDYLOSIS OF THORACIC REGION WITHOUT MYELOPATHY OR RADICULOPATHY: Status: ACTIVE | Noted: 2019-08-05

## 2019-08-05 PROBLEM — M40.04 POSTURAL KYPHOSIS OF THORACIC REGION: Status: ACTIVE | Noted: 2019-08-05

## 2019-08-09 ENCOUNTER — LAB (OUTPATIENT)
Dept: LAB | Facility: HOSPITAL | Age: 64
End: 2019-08-09

## 2019-08-09 DIAGNOSIS — E53.8 B12 DEFICIENCY: ICD-10-CM

## 2019-08-09 DIAGNOSIS — E55.9 VITAMIN D DEFICIENCY: ICD-10-CM

## 2019-08-09 DIAGNOSIS — E78.2 MIXED HYPERLIPIDEMIA: ICD-10-CM

## 2019-08-09 DIAGNOSIS — Z11.59 ENCOUNTER FOR HEPATITIS C SCREENING TEST FOR LOW RISK PATIENT: ICD-10-CM

## 2019-08-09 LAB
25(OH)D3 SERPL-MCNC: 15.1 NG/ML (ref 30–100)
ALBUMIN SERPL-MCNC: 3.8 G/DL (ref 3.5–5.2)
ALBUMIN/GLOB SERPL: 1.2 G/DL
ALP SERPL-CCNC: 107 U/L (ref 39–117)
ALT SERPL W P-5'-P-CCNC: 26 U/L (ref 1–33)
ANION GAP SERPL CALCULATED.3IONS-SCNC: 8.4 MMOL/L (ref 5–15)
AST SERPL-CCNC: 22 U/L (ref 1–32)
BASOPHILS # BLD AUTO: 0.04 10*3/MM3 (ref 0–0.2)
BASOPHILS NFR BLD AUTO: 1.1 % (ref 0–1.5)
BILIRUB SERPL-MCNC: 0.4 MG/DL (ref 0.2–1.2)
BUN BLD-MCNC: 6 MG/DL (ref 8–23)
BUN/CREAT SERPL: 7.7 (ref 7–25)
CALCIUM SPEC-SCNC: 9.2 MG/DL (ref 8.6–10.5)
CHLORIDE SERPL-SCNC: 103 MMOL/L (ref 98–107)
CHOLEST SERPL-MCNC: 214 MG/DL (ref 0–200)
CO2 SERPL-SCNC: 26.6 MMOL/L (ref 22–29)
CREAT BLD-MCNC: 0.78 MG/DL (ref 0.57–1)
DEPRECATED RDW RBC AUTO: 43.3 FL (ref 37–54)
EOSINOPHIL # BLD AUTO: 0.12 10*3/MM3 (ref 0–0.4)
EOSINOPHIL NFR BLD AUTO: 3.4 % (ref 0.3–6.2)
ERYTHROCYTE [DISTWIDTH] IN BLOOD BY AUTOMATED COUNT: 13.2 % (ref 12.3–15.4)
GFR SERPL CREATININE-BSD FRML MDRD: 74 ML/MIN/1.73
GLOBULIN UR ELPH-MCNC: 3.3 GM/DL
GLUCOSE BLD-MCNC: 95 MG/DL (ref 65–99)
HCT VFR BLD AUTO: 48 % (ref 34–46.6)
HCV AB SER DONR QL: NORMAL
HDLC SERPL-MCNC: 52 MG/DL (ref 40–60)
HGB BLD-MCNC: 15.1 G/DL (ref 12–15.9)
IMM GRANULOCYTES # BLD AUTO: 0.01 10*3/MM3 (ref 0–0.05)
IMM GRANULOCYTES NFR BLD AUTO: 0.3 % (ref 0–0.5)
LDLC SERPL CALC-MCNC: 134 MG/DL (ref 0–100)
LDLC/HDLC SERPL: 2.57 {RATIO}
LYMPHOCYTES # BLD AUTO: 1.41 10*3/MM3 (ref 0.7–3.1)
LYMPHOCYTES NFR BLD AUTO: 39.4 % (ref 19.6–45.3)
MCH RBC QN AUTO: 28.3 PG (ref 26.6–33)
MCHC RBC AUTO-ENTMCNC: 31.5 G/DL (ref 31.5–35.7)
MCV RBC AUTO: 89.9 FL (ref 79–97)
MONOCYTES # BLD AUTO: 0.38 10*3/MM3 (ref 0.1–0.9)
MONOCYTES NFR BLD AUTO: 10.6 % (ref 5–12)
NEUTROPHILS # BLD AUTO: 1.62 10*3/MM3 (ref 1.7–7)
NEUTROPHILS NFR BLD AUTO: 45.2 % (ref 42.7–76)
NRBC BLD AUTO-RTO: 0 /100 WBC (ref 0–0.2)
PLATELET # BLD AUTO: 218 10*3/MM3 (ref 140–450)
PMV BLD AUTO: 12 FL (ref 6–12)
POTASSIUM BLD-SCNC: 4.7 MMOL/L (ref 3.5–5.2)
PROT SERPL-MCNC: 7.1 G/DL (ref 6–8.5)
RBC # BLD AUTO: 5.34 10*6/MM3 (ref 3.77–5.28)
SODIUM BLD-SCNC: 138 MMOL/L (ref 136–145)
TRIGL SERPL-MCNC: 141 MG/DL (ref 0–150)
VIT B12 BLD-MCNC: 258 PG/ML (ref 211–946)
VLDLC SERPL-MCNC: 28.2 MG/DL (ref 5–40)
WBC NRBC COR # BLD: 3.58 10*3/MM3 (ref 3.4–10.8)

## 2019-08-09 PROCEDURE — 80053 COMPREHEN METABOLIC PANEL: CPT

## 2019-08-09 PROCEDURE — 82607 VITAMIN B-12: CPT

## 2019-08-09 PROCEDURE — 80061 LIPID PANEL: CPT

## 2019-08-09 PROCEDURE — 86803 HEPATITIS C AB TEST: CPT

## 2019-08-09 PROCEDURE — 85025 COMPLETE CBC W/AUTO DIFF WBC: CPT

## 2019-08-09 PROCEDURE — 82306 VITAMIN D 25 HYDROXY: CPT

## 2019-08-16 ENCOUNTER — OFFICE VISIT (OUTPATIENT)
Dept: INTERNAL MEDICINE | Facility: CLINIC | Age: 64
End: 2019-08-16

## 2019-08-16 VITALS
BODY MASS INDEX: 43 KG/M2 | HEIGHT: 60 IN | DIASTOLIC BLOOD PRESSURE: 62 MMHG | WEIGHT: 219 LBS | HEART RATE: 76 BPM | SYSTOLIC BLOOD PRESSURE: 110 MMHG | TEMPERATURE: 97.1 F

## 2019-08-16 DIAGNOSIS — R10.13 DYSPEPSIA: ICD-10-CM

## 2019-08-16 DIAGNOSIS — E53.8 B12 DEFICIENCY: ICD-10-CM

## 2019-08-16 DIAGNOSIS — J43.2 CENTRILOBULAR EMPHYSEMA (HCC): ICD-10-CM

## 2019-08-16 DIAGNOSIS — E78.2 MIXED HYPERLIPIDEMIA: ICD-10-CM

## 2019-08-16 DIAGNOSIS — G47.33 OSA ON CPAP: ICD-10-CM

## 2019-08-16 DIAGNOSIS — Z99.89 OSA ON CPAP: ICD-10-CM

## 2019-08-16 DIAGNOSIS — I70.0 AORTIC ATHEROSCLEROSIS (HCC): Primary | ICD-10-CM

## 2019-08-16 DIAGNOSIS — E55.9 VITAMIN D DEFICIENCY: ICD-10-CM

## 2019-08-16 PROCEDURE — 99214 OFFICE O/P EST MOD 30 MIN: CPT | Performed by: NURSE PRACTITIONER

## 2019-08-16 RX ORDER — OMEPRAZOLE 20 MG/1
20 CAPSULE, DELAYED RELEASE ORAL DAILY
Refills: 4 | COMMUNITY
Start: 2019-08-15 | End: 2019-11-18

## 2019-08-16 RX ORDER — ERGOCALCIFEROL 1.25 MG/1
50000 CAPSULE ORAL WEEKLY
Qty: 12 CAPSULE | Refills: 3 | Status: SHIPPED | OUTPATIENT
Start: 2019-08-16 | End: 2021-06-08 | Stop reason: SDUPTHER

## 2019-08-16 RX ORDER — LANOLIN ALCOHOL/MO/W.PET/CERES
1000 CREAM (GRAM) TOPICAL DAILY
Qty: 90 TABLET | Refills: 3 | Status: SHIPPED | OUTPATIENT
Start: 2019-08-16 | End: 2021-06-08 | Stop reason: SDUPTHER

## 2019-08-16 NOTE — PATIENT INSTRUCTIONS
Fantastic progress with weight loss and increased exercise!  Keep up the good work.    We will check labs again in 3mo.    Hyperlipidemia:  Exercise at least 30 min 3-4 days per week.  Goal for 150 min/week total.   •Diet is a vital tool for lowering cholesterol and blood pressure levels, which are two major risk factors for cardiovascular disease.   •Patients with high cholesterol and high blood pressure levels should eat plenty of vegetables, fruits and whole grains and incorporate low-fat dairy products, poultry, fish, legumes, non-tropical vegetable oils and nuts into their diet. They should also limit intake of sweets, sugar-sweetened beverages and red meats.      Information obtained from the American College of Cardiology and the American Heart Association.

## 2019-08-16 NOTE — PROGRESS NOTES
Lizabeth Rangel  1955  [id number]  Patient Care Team:  Holli Kaur APRN as PCP - General (Internal Medicine)    Lizabeth Rangel is a pleasant 63 y.o. female who presents for evaluation of Results (Follow up on CT of chest results )      Chief Complaint   Patient presents with   • Results     Follow up on CT of chest results        HPI:     Shireen decided to pursue a coronary calcium score screening at [facility].  Those results have been added to her chart here.  We have seen on past CTs that she has some atherosclerotic changes, results from the calcium score test were consistent with the CTs we have seen over the last 2 years while following pulmonary nodule.    Obesity: She has made tremendous efforts to change her diet and begin exercising.  She wants to avoid medications if at all possible, particularly in relationship to cholesterol.  She has lost 28 pounds since changing to a plant-based diet on July 10.  She has been following the recommendations of cardiology Jaylen Forbes program which includes all plant-based, whole grains, meditation, exercise.  She does not eat meat.  She is walking the dogs a mile and a half almost every day and is going to the gym for some workouts.    Hyperlipidemia: Her lipid panel has tremendously improved from the last measurement October 10, 2018.  Labs at that time showed total cholesterol 269, triglycerides 182, HDL 58, .  Labs this week show cholesterol 214, triglycerides 141, HDL 52, .    GERD: Had never started omeprazole but saw Dr. Roach yesterday, he recommended starting, she plans to.      Vitamin D deficiency: She was taking vitamin D weekly but has been off of it for months now.    Vitamin B def: Has not taken B12 supplements, B12 remains lower than goal, she is now avoiding meat with a new diet.    CAREN:  She is using cpap  Past Medical History:   Diagnosis Date   • Allergic rhinitis    • Ankle fracture 1996    and 1992 -  left; surg rerpair    • Gallstone pancreatitis    • Perforated bowel (CMS/HCC)    • Sleep apnea      Past Surgical History:   Procedure Laterality Date   • ANKLE ARTHROSCOPY     • CATARACT EXTRACTION     • CATARACT EXTRACTION     • CHOLECYSTECTOMY     • COLON RESECTION       Family History   Problem Relation Age of Onset   • Obesity Mother    • Alcohol abuse Father    • Obesity Father    • Breast cancer Maternal Aunt 30   • Ovarian cancer Neg Hx    • Endometrial cancer Neg Hx      Social History     Tobacco Use   Smoking Status Former Smoker   • Types: Cigarettes   • Last attempt to quit: 1989   • Years since quittin.1   Smokeless Tobacco Never Used     Allergies   Allergen Reactions   • Thimerosal Itching     Itchy red eyes        Current Outpatient Medications:   •  fexofenadine (ALLEGRA) 60 MG tablet, Take 60 mg by mouth Daily As Needed., Disp: , Rfl:   •  fluticasone (FLONASE) 50 MCG/ACT nasal spray, 2 sprays into the nostril(s) as directed by provider Daily As Needed for Allergies., Disp: , Rfl:   •  omeprazole (priLOSEC) 20 MG capsule, Take 20 mg by mouth Daily., Disp: , Rfl: 4  •  vitamin B-12 (CYANOCOBALAMIN) 1000 MCG tablet, Take 1 tablet by mouth Daily., Disp: 90 tablet, Rfl: 3  •  vitamin D (ERGOCALCIFEROL) 05738 units capsule capsule, Take 1 capsule by mouth 1 (One) Time Per Week., Disp: 12 capsule, Rfl: 3    Review of Systems   Constitutional: Negative for chills, fatigue and fever.   HENT: Negative for congestion, ear pain and sinus pressure.    Respiratory: Negative for cough, chest tightness, shortness of breath and wheezing.    Cardiovascular: Negative for chest pain and palpitations.   Gastrointestinal: Negative for abdominal pain, blood in stool and constipation.   Skin: Negative for color change.   Allergic/Immunologic: Positive for environmental allergies.   Neurological: Negative for dizziness, speech difficulty and headache.   Psychiatric/Behavioral: Negative  "for decreased concentration. The patient is not nervous/anxious.      /62 (BP Location: Left arm, Patient Position: Sitting, Cuff Size: Adult)   Pulse 76   Temp 97.1 °F (36.2 °C) (Temporal)   Ht 152.4 cm (60\")   Wt 99.3 kg (219 lb)   BMI 42.77 kg/m²     Physical Exam   Constitutional: She appears well-developed and well-nourished. She is morbidly obese.  HENT:   Head: Normocephalic and atraumatic.   Right Ear: External ear normal.   Left Ear: External ear normal.   Mouth/Throat: Oropharynx is clear and moist.   Eyes: Conjunctivae and EOM are normal.   Neck: Normal range of motion. Neck supple.   Cardiovascular: Normal rate, regular rhythm and normal heart sounds.   Pulmonary/Chest: Effort normal and breath sounds normal.   Abdominal: Soft. Bowel sounds are normal.   Musculoskeletal: Normal range of motion.   Lymphadenopathy:     She has no cervical adenopathy.   Neurological: She is alert.   Skin: Skin is warm and dry.   Psychiatric: She has a normal mood and affect. Her behavior is normal. Thought content normal.       Results Review:  I reviewed the patient's new clinical results.    Assessment/Plan:  Lizabeth was seen today for results.    Diagnoses and all orders for this visit:    Aortic atherosclerosis (CMS/HCC)    CAREN on CPAP  Comments:  Continue CPAP nightly and yearly follow-ups with Dr. Hamilton      Centrilobular emphysema (CMS/HCC)  Comments:  No current symptoms, no daily maintenance medications.    Vitamin D deficiency  Comments:  Start vitamin D weekly  Orders:  -     vitamin D (ERGOCALCIFEROL) 20958 units capsule capsule; Take 1 capsule by mouth 1 (One) Time Per Week.    B12 deficiency  Comments:  Start daily B12 supplement  Orders:  -     vitamin B-12 (CYANOCOBALAMIN) 1000 MCG tablet; Take 1 tablet by mouth Daily.    Mixed hyperlipidemia    Dyspepsia  Comments:  She plans to start omeprazole as recommended by Dr. Roach       Patient Instructions   Fantastic progress with weight loss and " increased exercise!  Keep up the good work.    We will check labs again in 3mo.    Hyperlipidemia:  Exercise at least 30 min 3-4 days per week.  Goal for 150 min/week total.   •Diet is a vital tool for lowering cholesterol and blood pressure levels, which are two major risk factors for cardiovascular disease.   •Patients with high cholesterol and high blood pressure levels should eat plenty of vegetables, fruits and whole grains and incorporate low-fat dairy products, poultry, fish, legumes, non-tropical vegetable oils and nuts into their diet. They should also limit intake of sweets, sugar-sweetened beverages and red meats.      Information obtained from the American College of Cardiology and the American Heart Association.      Plan of care reviewed with patient at the conclusion of today's visit. Education was provided regarding diagnosis, management and any prescribed or recommended OTC medications.  Patient verbalizes understanding of and agreement with management plan.    Return in about 3 months (around 11/16/2019) for Labs next visit.    *Note that portions of this note were completed with a voice recognition program.  Efforts were made to edit the dictation but occasionally words are transcribed.    VALDEZ Rai

## 2019-09-25 ENCOUNTER — OFFICE VISIT (OUTPATIENT)
Dept: PULMONOLOGY | Facility: CLINIC | Age: 64
End: 2019-09-25

## 2019-09-25 VITALS
DIASTOLIC BLOOD PRESSURE: 80 MMHG | WEIGHT: 215 LBS | HEART RATE: 78 BPM | HEIGHT: 64 IN | OXYGEN SATURATION: 97 % | BODY MASS INDEX: 36.7 KG/M2 | SYSTOLIC BLOOD PRESSURE: 132 MMHG | TEMPERATURE: 98.6 F

## 2019-09-25 DIAGNOSIS — J43.2 CENTRILOBULAR EMPHYSEMA (HCC): ICD-10-CM

## 2019-09-25 DIAGNOSIS — R91.1 SOLITARY PULMONARY NODULE: ICD-10-CM

## 2019-09-25 DIAGNOSIS — G47.33 OSA ON CPAP: Primary | ICD-10-CM

## 2019-09-25 DIAGNOSIS — Z99.89 OSA ON CPAP: Primary | ICD-10-CM

## 2019-09-25 PROCEDURE — 94375 RESPIRATORY FLOW VOLUME LOOP: CPT | Performed by: INTERNAL MEDICINE

## 2019-09-25 PROCEDURE — 99214 OFFICE O/P EST MOD 30 MIN: CPT | Performed by: INTERNAL MEDICINE

## 2019-09-25 PROCEDURE — 94729 DIFFUSING CAPACITY: CPT | Performed by: INTERNAL MEDICINE

## 2019-09-25 PROCEDURE — 94726 PLETHYSMOGRAPHY LUNG VOLUMES: CPT | Performed by: INTERNAL MEDICINE

## 2019-09-25 NOTE — PROGRESS NOTES
Subjective:     Chief Complaint:   Chief Complaint   Patient presents with   • Shortness of Breath       HPI:    Lizabeth Rnagel is a 63 y.o. female here for follow-up of pulmonary nodule    I saw her last year in consultation.  She had undergone a low-dose screening CAT scan at [facility].  A 6 mm nodule was found in the lingula.  This was described as groundglass.  Mild centrilobular emphysematous changes were noted.  She had a remote history of smoking having quit in 1989.    She underwent follow-up CAT scanning in June of this year.  She did not bring the disc with her but I do have the report which indicates a stable nodule and describes some additional inflammatory or fibrotic changes in the lingula, it is difficult to say.  Follow-up was recommended in 1 year.    Further details:    General symptoms:  - weight loss of 32 lbs intentional    Exercise tolerance:  - dyspnea with heavy exercise only    Chest symptoms:  - no chest pain  - mild cough    Sputum:  - denies hemoptysis    Upper airway:  - congestion    Recent imaging:  - CT of chest:  Report reviewed and f/u recommended one year    Current medications are:   Current Outpatient Medications:   •  fexofenadine (ALLEGRA) 60 MG tablet, Take 60 mg by mouth Daily As Needed., Disp: , Rfl:   •  fluticasone (FLONASE) 50 MCG/ACT nasal spray, 2 sprays into the nostril(s) as directed by provider Daily As Needed for Allergies., Disp: , Rfl:   •  omeprazole (priLOSEC) 20 MG capsule, Take 20 mg by mouth Daily., Disp: , Rfl: 4  •  vitamin B-12 (CYANOCOBALAMIN) 1000 MCG tablet, Take 1 tablet by mouth Daily., Disp: 90 tablet, Rfl: 3  •  vitamin D (ERGOCALCIFEROL) 16314 units capsule capsule, Take 1 capsule by mouth 1 (One) Time Per Week., Disp: 12 capsule, Rfl: 3.      The patient's relevant past medical, surgical, family and social history were reviewed and updated in Epic as appropriate.     ROS:    Review of Systems  ROS as documented in patient  questionnaire unless as noted otherwise    Objective:    Physical Exam   Constitutional: She is oriented to person, place, and time. She appears well-developed and well-nourished.   HENT:   Head: Normocephalic and atraumatic.   Mouth/Throat: Oropharynx is clear and moist.   Class IV airway   Neck: Neck supple. No thyromegaly present.   Cardiovascular: Normal rate and regular rhythm. Exam reveals no gallop and no friction rub.   No murmur heard.  Pulmonary/Chest: Effort normal. No respiratory distress. She has no wheezes. She has no rales.   Musculoskeletal: She exhibits no edema.   Neurological: She is alert and oriented to person, place, and time.   Skin: Skin is warm and dry.   Psychiatric: She has a normal mood and affect. Her behavior is normal.   Vitals reviewed.      Diagnostics:     PFT:  - mild airway obstruction  - no significant change over prior studies    CXR:  - no additional    Assessment:    Problem List Items Addressed This Visit        Pulmonary Problems    CAREN on CPAP - Primary    Solitary pulmonary nodule    Overview     1. 6 mm GG lingula 6/2018  2. No enlargement 6/2019         Relevant Orders    CT Chest Without Contrast    Centrilobular emphysema (CMS/HCC)          6 mm groundglass nodule in the lingula with no significant change in size over one year.    She has obstructive sleep apnea which is treated with nocturnal CPAP therapy.    She has mild, asymptomatic emphysema that was incidentally noted.  She does have some mild physiologic airway obstruction by PFTs which is stable and a remote history of smoking.    Plan:     1. As before, I would not suggest inhaled therapy as she is asymptomatic  2. 1 year follow-up CAT scan in June 2020 for category 2 nodule  3. Continue current CPAP prescription  4. Follow-up after the above    Level of Risk Moderate due to: two stable chronic illnesses    Discussed in detail with the patient.  She will call prior to her follow up visit for any new  problems.    Signed by  Gary Hamilton MD

## 2019-11-06 DIAGNOSIS — L98.9 SKIN LESION: Primary | ICD-10-CM

## 2019-11-11 DIAGNOSIS — E55.9 VITAMIN D DEFICIENCY: ICD-10-CM

## 2019-11-11 DIAGNOSIS — E78.2 MIXED HYPERLIPIDEMIA: Primary | ICD-10-CM

## 2019-11-11 DIAGNOSIS — E53.8 B12 DEFICIENCY: ICD-10-CM

## 2019-11-12 ENCOUNTER — LAB (OUTPATIENT)
Dept: LAB | Facility: HOSPITAL | Age: 64
End: 2019-11-12

## 2019-11-12 DIAGNOSIS — E55.9 VITAMIN D DEFICIENCY: ICD-10-CM

## 2019-11-12 DIAGNOSIS — E53.8 B12 DEFICIENCY: ICD-10-CM

## 2019-11-12 DIAGNOSIS — E78.2 MIXED HYPERLIPIDEMIA: ICD-10-CM

## 2019-11-12 LAB
25(OH)D3 SERPL-MCNC: 31.3 NG/ML (ref 30–100)
ALBUMIN SERPL-MCNC: 3.5 G/DL (ref 3.5–5.2)
ALBUMIN/GLOB SERPL: 0.9 G/DL
ALP SERPL-CCNC: 106 U/L (ref 39–117)
ALT SERPL W P-5'-P-CCNC: 17 U/L (ref 1–33)
ANION GAP SERPL CALCULATED.3IONS-SCNC: 12.4 MMOL/L (ref 5–15)
AST SERPL-CCNC: 14 U/L (ref 1–32)
BASOPHILS # BLD AUTO: 0.07 10*3/MM3 (ref 0–0.2)
BASOPHILS NFR BLD AUTO: 1.4 % (ref 0–1.5)
BILIRUB SERPL-MCNC: 0.4 MG/DL (ref 0.2–1.2)
BUN BLD-MCNC: 9 MG/DL (ref 8–23)
BUN/CREAT SERPL: 13.2 (ref 7–25)
CALCIUM SPEC-SCNC: 9.2 MG/DL (ref 8.6–10.5)
CHLORIDE SERPL-SCNC: 100 MMOL/L (ref 98–107)
CHOLEST SERPL-MCNC: 243 MG/DL (ref 0–200)
CO2 SERPL-SCNC: 25.6 MMOL/L (ref 22–29)
CREAT BLD-MCNC: 0.68 MG/DL (ref 0.57–1)
DEPRECATED RDW RBC AUTO: 37.9 FL (ref 37–54)
EOSINOPHIL # BLD AUTO: 0.12 10*3/MM3 (ref 0–0.4)
EOSINOPHIL NFR BLD AUTO: 2.4 % (ref 0.3–6.2)
ERYTHROCYTE [DISTWIDTH] IN BLOOD BY AUTOMATED COUNT: 12.1 % (ref 12.3–15.4)
GFR SERPL CREATININE-BSD FRML MDRD: 87 ML/MIN/1.73
GLOBULIN UR ELPH-MCNC: 3.8 GM/DL
GLUCOSE BLD-MCNC: 102 MG/DL (ref 65–99)
HCT VFR BLD AUTO: 42.9 % (ref 34–46.6)
HDLC SERPL-MCNC: 53 MG/DL (ref 40–60)
HGB BLD-MCNC: 14.8 G/DL (ref 12–15.9)
IMM GRANULOCYTES # BLD AUTO: 0.02 10*3/MM3 (ref 0–0.05)
IMM GRANULOCYTES NFR BLD AUTO: 0.4 % (ref 0–0.5)
LDLC SERPL CALC-MCNC: 167 MG/DL (ref 0–100)
LDLC/HDLC SERPL: 3.16 {RATIO}
LYMPHOCYTES # BLD AUTO: 1.6 10*3/MM3 (ref 0.7–3.1)
LYMPHOCYTES NFR BLD AUTO: 31.6 % (ref 19.6–45.3)
MCH RBC QN AUTO: 29.8 PG (ref 26.6–33)
MCHC RBC AUTO-ENTMCNC: 34.5 G/DL (ref 31.5–35.7)
MCV RBC AUTO: 86.5 FL (ref 79–97)
MONOCYTES # BLD AUTO: 0.55 10*3/MM3 (ref 0.1–0.9)
MONOCYTES NFR BLD AUTO: 10.8 % (ref 5–12)
NEUTROPHILS # BLD AUTO: 2.71 10*3/MM3 (ref 1.7–7)
NEUTROPHILS NFR BLD AUTO: 53.4 % (ref 42.7–76)
NRBC BLD AUTO-RTO: 0 /100 WBC (ref 0–0.2)
PLATELET # BLD AUTO: 226 10*3/MM3 (ref 140–450)
PMV BLD AUTO: 11.4 FL (ref 6–12)
POTASSIUM BLD-SCNC: 4.7 MMOL/L (ref 3.5–5.2)
PROT SERPL-MCNC: 7.3 G/DL (ref 6–8.5)
RBC # BLD AUTO: 4.96 10*6/MM3 (ref 3.77–5.28)
SODIUM BLD-SCNC: 138 MMOL/L (ref 136–145)
TRIGL SERPL-MCNC: 113 MG/DL (ref 0–150)
VIT B12 BLD-MCNC: 502 PG/ML (ref 211–946)
VLDLC SERPL-MCNC: 22.6 MG/DL (ref 5–40)
WBC NRBC COR # BLD: 5.07 10*3/MM3 (ref 3.4–10.8)

## 2019-11-12 PROCEDURE — 85025 COMPLETE CBC W/AUTO DIFF WBC: CPT

## 2019-11-12 PROCEDURE — 82306 VITAMIN D 25 HYDROXY: CPT

## 2019-11-12 PROCEDURE — 80061 LIPID PANEL: CPT

## 2019-11-12 PROCEDURE — 82607 VITAMIN B-12: CPT

## 2019-11-12 PROCEDURE — 80053 COMPREHEN METABOLIC PANEL: CPT

## 2019-11-18 ENCOUNTER — OFFICE VISIT (OUTPATIENT)
Dept: INTERNAL MEDICINE | Facility: CLINIC | Age: 64
End: 2019-11-18

## 2019-11-18 VITALS
BODY MASS INDEX: 36.54 KG/M2 | SYSTOLIC BLOOD PRESSURE: 112 MMHG | WEIGHT: 214 LBS | HEIGHT: 64 IN | HEART RATE: 72 BPM | DIASTOLIC BLOOD PRESSURE: 70 MMHG

## 2019-11-18 DIAGNOSIS — E78.2 MIXED HYPERLIPIDEMIA: Primary | ICD-10-CM

## 2019-11-18 DIAGNOSIS — E66.01 MORBID OBESITY (HCC): ICD-10-CM

## 2019-11-18 PROCEDURE — 99214 OFFICE O/P EST MOD 30 MIN: CPT | Performed by: NURSE PRACTITIONER

## 2019-11-18 RX ORDER — KETOCONAZOLE 20 MG/G
CREAM TOPICAL
Refills: 2 | COMMUNITY
Start: 2019-11-11 | End: 2023-02-13

## 2019-11-18 RX ORDER — KETOCONAZOLE 20 MG/ML
SHAMPOO TOPICAL
Refills: 3 | COMMUNITY
Start: 2019-11-08 | End: 2023-02-13

## 2019-11-18 NOTE — PATIENT INSTRUCTIONS
"Daily weight loss goals:  water = 100 oz  protein =   75-95 grams per day  calories =  women avg 1500 calories                       The patient was counseled on goals and the need for weight reduction.   The body mass index (BMI), which is a ratio of weight to height, while not a perfect measurement of body fat, does correlate strongly with various metabolic and disease outcomes. A BMI in the 18.5 - 24.9 range is considered a normal or healthy weight. A BMI above 25 is considered overweight.  A BMI of 30 or above is considered obese.  A BMI of 35 and above with any chronic conditions (such as hypertension, hyperlipidemia or Coronary Artery Disease is considered morbidly obese. Even an initial 10% reduction in body weight can offer important health benefits and reduce long-term cardiovascular risk.     While there are an array of popular/fad diets being promoted, I generally recommend a diet of vegetables, fruits, whole grains, and lean protein sources including low-fat dairy products, poultry, and nuts with an emphasis toward minimizing intake of sweets, carbohydrates, and red meats. Obtaining 30-40 minutes of moderate to vigorous-intensity aerobic exercise on 4-5 days weekly will assist you in reaching your optimal weight, not to mention the cardiovascular benefit, although no amount of exercise will usually overcome the effects of a poor diet. (Moderate activity means you can talk, but not sing, while you are active.) While this may seem like an insurmountable task at first to some, adding 10 minutes of aerobic activity to your current amount of exercise gradually can help you reach the above goal. The National [place] of Health's \"Aim for a Healthy Weight\" website offers practical information on how you may improve your current diet and exercise habits. I have included the website's URL here for your reference: https://www.nhlbi.nih.gov/health/educational/lose_wt/index.htm. A nutritionist can help support you " in developing an individualized diet plan. If you are interested, I would be happy to place a referral. Just let me know.       Hyperlipidemia:  Continue your medications as directed.  Exercise at least 30 min 3-4 days per week.  Goal for 150 min/week total.   •Diet is a vital tool for lowering cholesterol and blood pressure levels, which are two major risk factors for cardiovascular disease.   •Patients with high cholesterol and high blood pressure levels should eat plenty of vegetables, fruits and whole grains and incorporate low-fat dairy products, poultry, fish, legumes, non-tropical vegetable oils and nuts into their diet. They should also limit intake of sweets, sugar-sweetened beverages and red meats.      Information obtained from the American College of Cardiology and the American Heart Association.

## 2019-11-18 NOTE — PROGRESS NOTES
Lizabeth Rangel  1955  [id number]  Patient Care Team:  Holli Kaur APRN as PCP - General (Internal Medicine)    Lizabeth Rangel is a pleasant 63 y.o. female who presents for evaluation of Vitamin D Deficiency (3 month follow up ); Hypertension; and Hyperlipidemia      Chief Complaint   Patient presents with   • Vitamin D Deficiency     3 month follow up    • Hypertension   • Hyperlipidemia       HPI:   Morbid obesity: No eating 7pm to 7am.  Not as strict with diet but has cut out flour and bread for the most part, lots of veggies and fruit, no meat, some cheese, beans, about 16 oz water.  Lots of coffee, black.  Overall lost ~35 lbs since .  About 4 to 5 pounds since last visit here.    Hyperlipidemia: Overall cholesterol and LDL have increased some.  She is adamant that she does not want to take a statin.    Saw derm for treatment scalp, has full skinsurvey coming up.    GI recent f/u never statted omeprazole, saw him recently, drinking a lot of coffee which is a trigger.  Past Medical History:   Diagnosis Date   • Allergic rhinitis    • Ankle fracture     and  - left; surg rerpair    • Gallstone pancreatitis    • Perforated bowel (CMS/HCC)    • Sleep apnea      Past Surgical History:   Procedure Laterality Date   • ANKLE ARTHROSCOPY     • CATARACT EXTRACTION     • CATARACT EXTRACTION     • CHOLECYSTECTOMY     • COLON RESECTION       Family History   Problem Relation Age of Onset   • Obesity Mother    • Alcohol abuse Father    • Obesity Father    • Breast cancer Maternal Aunt 30   • Ovarian cancer Neg Hx    • Endometrial cancer Neg Hx      Social History     Tobacco Use   Smoking Status Former Smoker   • Packs/day: 2.00   • Years: 40.00   • Pack years: 80.00   • Types: Cigarettes   • Last attempt to quit: 1989   • Years since quittin.4   Smokeless Tobacco Never Used     Allergies   Allergen Reactions   • Thimerosal Itching     Itchy red eyes   "      Current Outpatient Medications:   •  fexofenadine (ALLEGRA) 60 MG tablet, Take 60 mg by mouth Daily As Needed., Disp: , Rfl:   •  fluticasone (FLONASE) 50 MCG/ACT nasal spray, 2 sprays into the nostril(s) as directed by provider Daily As Needed for Allergies., Disp: , Rfl:   •  ketoconazole (NIZORAL) 2 % cream, APPLY TO RASH UNDER BREAST BID.  TAPER USE AS CONDITION IMPROVES, Disp: , Rfl: 2  •  ketoconazole (NIZORAL) 2 % shampoo, LATHER ON SCALP TWO TO THREE TIMES PER WEEK AND RINSE AFTER 5 MINUTES, Disp: , Rfl: 3  •  vitamin B-12 (CYANOCOBALAMIN) 1000 MCG tablet, Take 1 tablet by mouth Daily. (Patient taking differently: Take 1,000 mcg by mouth 3 (Three) Times a Week.), Disp: 90 tablet, Rfl: 3  •  vitamin D (ERGOCALCIFEROL) 00489 units capsule capsule, Take 1 capsule by mouth 1 (One) Time Per Week., Disp: 12 capsule, Rfl: 3    Review of Systems   Constitutional: Negative for chills, fatigue and fever.   HENT: Negative for congestion, ear pain and sinus pressure.    Respiratory: Negative for cough, chest tightness, shortness of breath and wheezing.    Cardiovascular: Negative for chest pain and palpitations.   Gastrointestinal: Negative for abdominal pain, blood in stool and constipation.   Skin: Negative for color change.   Allergic/Immunologic: Positive for environmental allergies.   Neurological: Negative for dizziness, speech difficulty and headache.   Psychiatric/Behavioral: Negative for decreased concentration. The patient is not nervous/anxious.      /70 (BP Location: Left arm, Patient Position: Sitting, Cuff Size: Large Adult)   Pulse 72   Ht 162.6 cm (64.02\")   Wt 97.1 kg (214 lb)   BMI 36.71 kg/m²     Physical Exam   Constitutional: She appears well-developed and well-nourished. She is morbidly obese.  HENT:   Head: Normocephalic and atraumatic.   Right Ear: External ear normal.   Left Ear: External ear normal.   Mouth/Throat: Oropharynx is clear and moist.   Eyes: Conjunctivae and EOM are " normal.   Neck: Normal range of motion. Neck supple.   Cardiovascular: Normal rate, regular rhythm and normal heart sounds.   Pulmonary/Chest: Effort normal and breath sounds normal.   Musculoskeletal: Normal range of motion.   Neurological: She is alert.   Skin: Skin is warm and dry.   Psychiatric: She has a normal mood and affect. Her behavior is normal. Thought content normal.       Results Review:  I reviewed the patient's new clinical results.    Assessment/Plan:  Lizabeth was seen today for vitamin d deficiency, hypertension and hyperlipidemia.    Diagnoses and all orders for this visit:    Mixed hyperlipidemia    Morbid obesity (CMS/HCC)       Patient Instructions   Daily weight loss goals:  water = 100 oz  protein =   75-95 grams per day  calories =  women avg 1500 calories                       The patient was counseled on goals and the need for weight reduction.   The body mass index (BMI), which is a ratio of weight to height, while not a perfect measurement of body fat, does correlate strongly with various metabolic and disease outcomes. A BMI in the 18.5 - 24.9 range is considered a normal or healthy weight. A BMI above 25 is considered overweight.  A BMI of 30 or above is considered obese.  A BMI of 35 and above with any chronic conditions (such as hypertension, hyperlipidemia or Coronary Artery Disease is considered morbidly obese. Even an initial 10% reduction in body weight can offer important health benefits and reduce long-term cardiovascular risk.     While there are an array of popular/fad diets being promoted, I generally recommend a diet of vegetables, fruits, whole grains, and lean protein sources including low-fat dairy products, poultry, and nuts with an emphasis toward minimizing intake of sweets, carbohydrates, and red meats. Obtaining 30-40 minutes of moderate to vigorous-intensity aerobic exercise on 4-5 days weekly will assist you in reaching your optimal weight, not to mention the  "cardiovascular benefit, although no amount of exercise will usually overcome the effects of a poor diet. (Moderate activity means you can talk, but not sing, while you are active.) While this may seem like an insurmountable task at first to some, adding 10 minutes of aerobic activity to your current amount of exercise gradually can help you reach the above goal. The National [place] of Health's \"Aim for a Healthy Weight\" website offers practical information on how you may improve your current diet and exercise habits. I have included the website's URL here for your reference: https://www.nhlbi.nih.gov/health/educational/lose_wt/index.htm. A nutritionist can help support you in developing an individualized diet plan. If you are interested, I would be happy to place a referral. Just let me know.       Hyperlipidemia:  Continue your medications as directed.  Exercise at least 30 min 3-4 days per week.  Goal for 150 min/week total.   •Diet is a vital tool for lowering cholesterol and blood pressure levels, which are two major risk factors for cardiovascular disease.   •Patients with high cholesterol and high blood pressure levels should eat plenty of vegetables, fruits and whole grains and incorporate low-fat dairy products, poultry, fish, legumes, non-tropical vegetable oils and nuts into their diet. They should also limit intake of sweets, sugar-sweetened beverages and red meats.      Information obtained from the American College of Cardiology and the American Heart Association.      Plan of care reviewed with patient at the conclusion of today's visit. Education was provided regarding diagnosis, management and any prescribed or recommended OTC medications.  Patient verbalizes understanding of and agreement with management plan.    Return in about 3 months (around 2/18/2020).    *Note that portions of this note were completed with a voice recognition program.  Efforts were made to edit the dictation but " occasionally words are transcribed.    Holli Kaur, APRN

## 2020-06-17 DIAGNOSIS — R91.1 SOLITARY PULMONARY NODULE: ICD-10-CM

## 2020-06-19 ENCOUNTER — OFFICE VISIT (OUTPATIENT)
Dept: PULMONOLOGY | Facility: CLINIC | Age: 65
End: 2020-06-19

## 2020-06-19 VITALS
SYSTOLIC BLOOD PRESSURE: 128 MMHG | BODY MASS INDEX: 37.56 KG/M2 | OXYGEN SATURATION: 97 % | HEIGHT: 64 IN | DIASTOLIC BLOOD PRESSURE: 78 MMHG | WEIGHT: 220 LBS | TEMPERATURE: 96.6 F | HEART RATE: 88 BPM

## 2020-06-19 DIAGNOSIS — G47.33 OSA ON CPAP: ICD-10-CM

## 2020-06-19 DIAGNOSIS — Z00.6 EXAMINATION FOR NORMAL COMPARISON FOR CLINICAL RESEARCH: Primary | ICD-10-CM

## 2020-06-19 DIAGNOSIS — J43.2 CENTRILOBULAR EMPHYSEMA (HCC): Primary | ICD-10-CM

## 2020-06-19 DIAGNOSIS — Z99.89 OSA ON CPAP: ICD-10-CM

## 2020-06-19 DIAGNOSIS — R91.1 SOLITARY PULMONARY NODULE: ICD-10-CM

## 2020-06-19 PROCEDURE — 99214 OFFICE O/P EST MOD 30 MIN: CPT | Performed by: INTERNAL MEDICINE

## 2020-06-19 NOTE — PROGRESS NOTES
Subjective:     Chief Complaint:   Chief Complaint   Patient presents with   • Shortness of Breath       HPI:    Lizabeth Rangel is a 64 y.o. female here for follow-up of pulmonary nodule    I saw her initially in 2018 for consultation regarding a pulmonary nodule.  She had undergone a low-dose screening CAT scan at [facility].  A 6 mm nodule was found in the lingula.  This was described as groundglass.  Mild centrilobular emphysematous changes were noted.  She had a remote history of smoking having quit in 1989.    She had a follow-up scan in June 2019.  This indicated complete stability of the groundglass nodule with a description of some postinflammatory/fibrotic changes in the lingula.    She had a follow-up scan at [facility] on June 16.  This revealed complete stability of the groundglass changes in the lingula.    Her symptoms are minimal.  She had a mild amount of airway obstruction in the past but any inhaled therapy was deferred due to this reason.    Further details:    General symptoms:  - no further weight loss    Exercise tolerance:  - dyspnea with heavy exercise only    Chest symptoms:  - no chest pain  - mild cough in AM    Sputum:  - denies hemoptysis    Upper airway:  - seasonal congestion    Recent imaging:  - CT of chest:  6/2020 stable 6mm ground glass nodule lingula    Current medications are:   Current Outpatient Medications:   •  fexofenadine (ALLEGRA) 60 MG tablet, Take 60 mg by mouth Daily As Needed., Disp: , Rfl:   •  fluticasone (FLONASE) 50 MCG/ACT nasal spray, 2 sprays into the nostril(s) as directed by provider Daily As Needed for Allergies., Disp: , Rfl:   •  vitamin B-12 (CYANOCOBALAMIN) 1000 MCG tablet, Take 1 tablet by mouth Daily. (Patient taking differently: Take 1,000 mcg by mouth 3 (Three) Times a Week.), Disp: 90 tablet, Rfl: 3  •  vitamin D (ERGOCALCIFEROL) 00128 units capsule capsule, Take 1 capsule by mouth 1 (One) Time Per Week., Disp:  12 capsule, Rfl: 3  •  ketoconazole (NIZORAL) 2 % cream, APPLY TO RASH UNDER BREAST BID.  TAPER USE AS CONDITION IMPROVES, Disp: , Rfl: 2  •  ketoconazole (NIZORAL) 2 % shampoo, LATHER ON SCALP TWO TO THREE TIMES PER WEEK AND RINSE AFTER 5 MINUTES, Disp: , Rfl: 3.      The patient's relevant past medical, surgical, family and social history were reviewed and updated in Epic as appropriate.     ROS:    Review of Systems  ROS as documented in patient questionnaire unless as noted otherwise    Objective:    Physical Exam   Constitutional: She is oriented to person, place, and time. She appears well-developed and well-nourished.   HENT:   Head: Normocephalic and atraumatic.   Mouth/Throat: Oropharynx is clear and moist.   Class IV airway   Neck: Neck supple. No thyromegaly present.   Cardiovascular: Normal rate and regular rhythm. Exam reveals no gallop and no friction rub.   No murmur heard.  Pulmonary/Chest: Effort normal. No respiratory distress. She has no wheezes. She has no rales.   Musculoskeletal: She exhibits no edema.   Neurological: She is alert and oriented to person, place, and time.   Skin: Skin is warm and dry.   Psychiatric: She has a normal mood and affect. Her behavior is normal.   Vitals reviewed.      Diagnostics:     PFT:  - no additional  CXR:  - no additional    Assessment:    Problem List Items Addressed This Visit        Pulmonary Problems    CAREN on CPAP    Solitary pulmonary nodule    Overview     1. 6 mm GG lingula 6/2018  2. No enlargement 6/2019  3. Stable 6/2020         Centrilobular emphysema (CMS/HCC) - Primary          1. Stable 6 mm groundglass nodule in the lingula: Reviewed images personally today.  2. Centrilobular emphysema: Mild obstruction by prior PFTs.  Very little symptoms.  No inhaled therapy at this point.  3. Obstructive sleep apnea on CPAP therapy: She says that she needs new supplies and is not been completely compliant recently.  I will renew her supplies.  4.   Plan:      1. As before, I would not suggest inhaled therapy as she is asymptomatic  2. 1 year follow-up CAT scan in June 2021.  The area has been stable for 2 years but for groundglass nodules, current recommendations are for 5 years of stability.  3. Continue current CPAP prescription.  Supplies ordered.  4. Yearly follow-up after her CAT scan.    Level of Risk Moderate due to: two stable chronic illnesses    Discussed in detail with the patient.  She will call prior to her follow up visit for any new problems.    Signed by  Gary Hamilton MD

## 2020-08-20 ENCOUNTER — OFFICE VISIT (OUTPATIENT)
Dept: ORTHOPEDIC SURGERY | Facility: CLINIC | Age: 65
End: 2020-08-20

## 2020-08-20 VITALS — BODY MASS INDEX: 43.28 KG/M2 | WEIGHT: 220.46 LBS | OXYGEN SATURATION: 98 % | HEART RATE: 77 BPM | HEIGHT: 60 IN

## 2020-08-20 DIAGNOSIS — M25.621 ELBOW STIFFNESS, RIGHT: ICD-10-CM

## 2020-08-20 DIAGNOSIS — S52.124A CLOSED NONDISPLACED FRACTURE OF HEAD OF RIGHT RADIUS, INITIAL ENCOUNTER: Primary | ICD-10-CM

## 2020-08-20 PROCEDURE — 99204 OFFICE O/P NEW MOD 45 MIN: CPT | Performed by: ORTHOPAEDIC SURGERY

## 2020-08-20 NOTE — PROGRESS NOTES
[facility] [place] Office Visit - Konrad Lind MD    Office Visit       Patient Name: Lizabeth Rangel    Chief Complaint:   Chief Complaint   Patient presents with   • Right Elbow - Pain       Referring Physician: Kathy Lopez, PRINCESS, AP*    History of Present Illness:   Lizabeth Rangel is a 64 y.o. female who presents with right body part: elbow Reason: pain.  Onset:Onset: mechanical fall. The issue has been ongoing for 1 day(s). Pain is a 7/10 on the pain scale. Pain is described as Pain Characterization: aching. Associated symptoms include Symptoms: pain. The pain is worse with sitting; resting and ice improve the pain. Previous treatments have included: bracing and NSAIDS. I have reviewed the patient's history of present illness as noted/entered above.    I have reviewed the patient's past medical history, surgical history, social history, family history, medications, and allergies as noted in the electronic medical record and as noted/entered.  I have reviewed the patient's review of systems as noted/enter and updated as noted in the patient's HPI.      Fall 8/19/2020  [facility] 8/19/2020 - I reviewed the [facility] note    XRAY right forearm AP and LATERAL [facility] 8/19/2020: Nondisplaced radial head fracture was noted on my review    3 dogs  House renovation    Spouse: Holly Morin - ongoing neck issues    Work -  law office      Subjective   Subjective      Review of Systems   Constitutional: Negative.  Negative for chills, fatigue and fever.   HENT: Negative.  Negative for congestion and dental problem.    Eyes: Negative.  Negative for blurred vision.   Respiratory: Negative.  Negative for shortness of breath.    Cardiovascular: Negative.  Negative for leg swelling.   Gastrointestinal: Negative.  Negative for abdominal pain.   Endocrine: Negative.  Negative for polyuria.   Genitourinary: Negative.  Negative for difficulty  urinating.   Musculoskeletal: Positive for arthralgias.   Skin: Negative.    Allergic/Immunologic: Negative.    Neurological: Negative.    Hematological: Negative.  Negative for adenopathy.   Psychiatric/Behavioral: Negative.  Negative for behavioral problems.        Past Medical History:   Past Medical History:   Diagnosis Date   • Allergic rhinitis    • Ankle fracture     and  - left; surg rerpair    • Gallstone pancreatitis    • Perforated bowel (CMS/HCC)    • Sleep apnea        Past Surgical History:   Past Surgical History:   Procedure Laterality Date   • ANKLE ARTHROSCOPY     • CATARACT EXTRACTION     • CATARACT EXTRACTION     • CHOLECYSTECTOMY     • COLON RESECTION         Family History:   Family History   Problem Relation Age of Onset   • Obesity Mother    • Alcohol abuse Father    • Obesity Father    • Breast cancer Maternal Aunt 30   • Ovarian cancer Neg Hx    • Endometrial cancer Neg Hx        Social History:   Social History     Socioeconomic History   • Marital status:      Spouse name: Not on file   • Number of children: Not on file   • Years of education: Not on file   • Highest education level: Not on file   Tobacco Use   • Smoking status: Former Smoker     Packs/day: 2.00     Years: 40.00     Pack years: 80.00     Types: Cigarettes     Last attempt to quit: 1989     Years since quittin.1   • Smokeless tobacco: Never Used   Substance and Sexual Activity   • Alcohol use: No   • Drug use: No   • Sexual activity: Defer   Social History Narrative    Caffeine: 8-10 servings of coffee in the morning       Medications:   Current Outpatient Medications:   •  fexofenadine (ALLEGRA) 60 MG tablet, Take 60 mg by mouth Daily As Needed., Disp: , Rfl:   •  fluticasone (FLONASE) 50 MCG/ACT nasal spray, 2 sprays into the nostril(s) as directed by provider Daily As Needed for Allergies., Disp: , Rfl:   •  HYDROcodone-acetaminophen (NORCO) 5-325 MG per tablet, Take 1  "tablet by mouth Every 6 (Six) Hours As Needed (fracture)., Disp: 12 tablet, Rfl: 0  •  ketoconazole (NIZORAL) 2 % cream, APPLY TO RASH UNDER BREAST BID.  TAPER USE AS CONDITION IMPROVES, Disp: , Rfl: 2  •  ketoconazole (NIZORAL) 2 % shampoo, LATHER ON SCALP TWO TO THREE TIMES PER WEEK AND RINSE AFTER 5 MINUTES, Disp: , Rfl: 3  •  vitamin B-12 (CYANOCOBALAMIN) 1000 MCG tablet, Take 1 tablet by mouth Daily. (Patient taking differently: Take 1,000 mcg by mouth 3 (Three) Times a Week.), Disp: 90 tablet, Rfl: 3  •  vitamin D (ERGOCALCIFEROL) 04839 units capsule capsule, Take 1 capsule by mouth 1 (One) Time Per Week., Disp: 12 capsule, Rfl: 3    Allergies:   Allergies   Allergen Reactions   • Thimerosal Itching     Itchy red eyes        The following portions of the patient's history were reviewed and updated as appropriate: allergies, current medications, past family history, past medical history, past social history, past surgical history and problem list.        Objective    Objective      Vital Signs:   Vitals:    08/20/20 1344   Pulse: 77   SpO2: 98%   Weight: 100 kg (220 lb 7.4 oz)   Height: 152.4 cm (60\")       Ortho Exam:  General: no acute distress, comfortable  Vitals reviewed in chart  Head: normocephalic, atraumatic  Ears: no external drainage noted  Eyes: extraocular muscles appear intact with good tracking  Psych: alert and oriented, normal appearing mood  Vascular: 2+ pulses symmetric, well perfused  Neurologic:  Sensation to light touch intact distally  Spine/Cervical exam: motion preserved  Dermatologic: skin not hot/red/swollen  Respiratory: breathing comfortably on room air, no intercostal retractions  Cardiovascular: regular rate and rhythm, well perfused    Musculoskeletal Exam:     SIDE: RIGHT  Elbow Exam    Tenderness: radial head    Range of motion measurements (degrees)  Elbow extension/flexion/pronation/supination  Range of motion limited by pain following fracture  Active: -20 to 115, mild " decreased supination  Passive: same    Loss of extension as noted    No block to motion  Painful arc of motion: yes  No evidence of septic joint      Results Review:   Imaging Results (Last 24 Hours)     ** No results found for the last 24 hours. **          As noted above.  Right nondisplaced radial head    Procedures             Assessment / Plan      Assessment/Plan:   Problem List Items Addressed This Visit        Musculoskeletal and Integument    Closed nondisplaced fracture of head of right radius - Primary       Other    Elbow stiffness, right        Right nondisplaced radial head fracture  Selective rest/activity modifications recommended while the elbow recovers.    Counseling - I counseled the patient on the diagnosis and treatment plan.  All questions were answered.    No weight bearing on affected elbow    Encouraged self-exercises to preserve elbow range of motion, especially to prevent loss of extension which is a common occurrence after elbow fractures.  I offered a formal physical therapy prescription to serve as an adjunct to a home exercise program depending on the findings and when I see her back in 3 weeks.    I counseled that is takes 10-12 weeks to heal up a fracture.  Preserving range of motion during this time will be critical.      Right elbow 3 views at next visit.    Follow Up:   Return in about 3 weeks (around 9/10/2020) for Fracture Follow-up with X-rays.        Konrad Lind MD, FAAOS  Orthopedic Surgeon  Fellowship Trained Shoulder and Elbow Surgeon  [facility]  Orthopedics and Sports Medicine  [address] [place] [address], [address]  [place] [address]    08/20/20  14:18    Please note that portions of this note may have been completed with a voice recognition program. Efforts were made to edit the dictations, but occasionally words are mistranscribed.

## 2020-09-04 ENCOUNTER — TRANSCRIBE ORDERS (OUTPATIENT)
Dept: INTERNAL MEDICINE | Facility: CLINIC | Age: 65
End: 2020-09-04

## 2020-09-04 DIAGNOSIS — R92.8 ABNORMAL MAMMOGRAPHY: ICD-10-CM

## 2020-09-04 DIAGNOSIS — Z12.31 VISIT FOR SCREENING MAMMOGRAM: Primary | ICD-10-CM

## 2020-09-10 ENCOUNTER — OFFICE VISIT (OUTPATIENT)
Dept: ORTHOPEDIC SURGERY | Facility: CLINIC | Age: 65
End: 2020-09-10

## 2020-09-10 VITALS — OXYGEN SATURATION: 98 % | HEIGHT: 60 IN | BODY MASS INDEX: 43.28 KG/M2 | WEIGHT: 220.46 LBS | HEART RATE: 60 BPM

## 2020-09-10 DIAGNOSIS — M25.621 ELBOW STIFFNESS, RIGHT: ICD-10-CM

## 2020-09-10 DIAGNOSIS — S52.124D CLOSED NONDISPLACED FRACTURE OF HEAD OF RIGHT RADIUS WITH ROUTINE HEALING, SUBSEQUENT ENCOUNTER: ICD-10-CM

## 2020-09-10 DIAGNOSIS — Z09 FRACTURE FOLLOW-UP: Primary | ICD-10-CM

## 2020-09-10 PROCEDURE — 99213 OFFICE O/P EST LOW 20 MIN: CPT | Performed by: ORTHOPAEDIC SURGERY

## 2020-09-10 NOTE — PROGRESS NOTES
[facility] Office Follow Up       Office Follow Up Visit       Patient Name: Lizabeth Rangel    Chief Complaint:   Chief Complaint   Patient presents with   • Right Elbow - Follow-up     3 week f/u Closed nondisplaced fracture of head of right radius ,        Referring Physician: No ref. provider found    History of Present Illness:   It has been 3  week(s) since Lizabeth Rangel's last visit. Lizabeth Rangel returns to clinic today for F/U: follow-up of rightBody Part: elbowReason: fracture. The issue has been ongoing for 1 month(s). Lizabeth Rangel rates HIS/HER: her pain at 2/10 on the pain scale. Previous/current treatments: NSAIDS. Current symptoms:Symptoms: same as prior visit.  resting improves the pain. Overall, he/she: sheis doing better. I have reviewed the patient's history of present illness as noted/entered above.    I have reviewed the patient's past medical history, surgical history, social history, family history, medications, and allergies as noted in the electronic medical record and as noted/entered.  I have reviewed the patient's review of systems as noted/enter and updated as noted in the patient's HPI.    Remarkable recovery, very stoic and doing well    RIGHT ELBOW        PRIOR HISTORY:  Fall 8/19/2020  [facility] 8/19/2020 - I reviewed the [facility] note     XRAY right forearm AP and LATERAL [facility] 8/19/2020: Nondisplaced radial head fracture was noted on my review     3 dogs  House renovation     Spouse: Holly Morin - ongoing neck issues     Work -  law office        Subjective   Subjective      Review of Systems   Constitutional: Negative.  Negative for chills, fatigue and fever.   HENT: Negative.  Negative for congestion and dental problem.    Eyes: Negative.  Negative for blurred vision.   Respiratory: Negative.  Negative for shortness of breath.    Cardiovascular: Negative.  Negative for leg  swelling.   Gastrointestinal: Negative.  Negative for abdominal pain.   Endocrine: Negative.  Negative for polyuria.   Genitourinary: Negative.  Negative for difficulty urinating.   Musculoskeletal: Positive for arthralgias.   Skin: Negative.    Allergic/Immunologic: Negative.    Neurological: Negative.    Hematological: Negative.  Negative for adenopathy.   Psychiatric/Behavioral: Negative.  Negative for behavioral problems.        Past Medical History:   Past Medical History:   Diagnosis Date   • Allergic rhinitis    • Ankle fracture     and  - left; surg rerpair    • Gallstone pancreatitis    • Perforated bowel (CMS/HCC)    • Sleep apnea        Past Surgical History:   Past Surgical History:   Procedure Laterality Date   • ANKLE ARTHROSCOPY     • CATARACT EXTRACTION     • CATARACT EXTRACTION     • CHOLECYSTECTOMY     • COLON RESECTION         Family History:   Family History   Problem Relation Age of Onset   • Obesity Mother    • Alcohol abuse Father    • Obesity Father    • Breast cancer Maternal Aunt 30   • Ovarian cancer Neg Hx    • Endometrial cancer Neg Hx        Social History:   Social History     Socioeconomic History   • Marital status:      Spouse name: Not on file   • Number of children: Not on file   • Years of education: Not on file   • Highest education level: Not on file   Tobacco Use   • Smoking status: Former Smoker     Packs/day: 2.00     Years: 40.00     Pack years: 80.00     Types: Cigarettes     Last attempt to quit: 1989     Years since quittin.2   • Smokeless tobacco: Never Used   Substance and Sexual Activity   • Alcohol use: No   • Drug use: No   • Sexual activity: Defer   Social History Narrative    Caffeine: 8-10 servings of coffee in the morning       Medications:   Current Outpatient Medications:   •  fexofenadine (ALLEGRA) 60 MG tablet, Take 60 mg by mouth Daily As Needed., Disp: , Rfl:   •  fluticasone (FLONASE) 50 MCG/ACT nasal  "spray, 2 sprays into the nostril(s) as directed by provider Daily As Needed for Allergies., Disp: , Rfl:   •  HYDROcodone-acetaminophen (NORCO) 5-325 MG per tablet, Take 1 tablet by mouth Every 6 (Six) Hours As Needed (fracture)., Disp: 12 tablet, Rfl: 0  •  ketoconazole (NIZORAL) 2 % cream, APPLY TO RASH UNDER BREAST BID.  TAPER USE AS CONDITION IMPROVES, Disp: , Rfl: 2  •  ketoconazole (NIZORAL) 2 % shampoo, LATHER ON SCALP TWO TO THREE TIMES PER WEEK AND RINSE AFTER 5 MINUTES, Disp: , Rfl: 3  •  vitamin B-12 (CYANOCOBALAMIN) 1000 MCG tablet, Take 1 tablet by mouth Daily. (Patient taking differently: Take 1,000 mcg by mouth 3 (Three) Times a Week.), Disp: 90 tablet, Rfl: 3  •  vitamin D (ERGOCALCIFEROL) 71390 units capsule capsule, Take 1 capsule by mouth 1 (One) Time Per Week., Disp: 12 capsule, Rfl: 3    Allergies:   Allergies   Allergen Reactions   • Thimerosal Itching     Itchy red eyes        The following portions of the patient's history were reviewed and updated as appropriate: allergies, current medications, past family history, past medical history, past social history, past surgical history and problem list.        Objective    Objective      Vital Signs:   Vitals:    09/10/20 1333   Pulse: 60   SpO2: 98%   Weight: 100 kg (220 lb 7.4 oz)   Height: 152.4 cm (60\")       Ortho Exam:  General: no acute distress, comfortable  General exam unchanged  Mask in place  Vitals reviewed in chart  Head: normocephalic, atraumatic  Ears: no external drainage noted  Eyes: extraocular muscles appear intact with good tracking  Psych: alert and oriented, normal appearing mood  Vascular: 2+ pulses symmetric, well perfused  Neurologic:  Sensation to light touch intact distally  Spine/Cervical exam: motion preserved  Dermatologic: skin not hot/red/swollen about elbow, small area on the palmar surface of her hand --recommend that she see Dr. Hung our hand expert to further evaluate -she notes this is been present for " some time  Respiratory: breathing comfortably on room air, no intercostal retractions  Cardiovascular: regular rate and rhythm, well perfused    Musculoskeletal Exam:     SIDE: RIGHT ELBOW  Elbow Exam    Tenderness: radial head, improved - essentially no pain at radial head    Range of motion measurements (degrees)  Elbow extension/flexion/pronation/supination  Range of motion limited by pain following fracture  Active: 0 to 120  Passive: full    Loss of extension has improved    No block to motion  Painful arc of motion: yes  No evidence of septic joint      Results Review:  Imaging Results (Last 24 Hours)     Procedure Component Value Units Date/Time    XR Elbow 3+ View Right [[id number]] Resulted:  09/10/20 1344     Updated:  09/10/20 1345    Narrative:       Imaging: elbow x-rays 3 views - AP, lateral, and oblique (radial head   view) elbow x-ray views    Side: RIGHT ELBOW    Indication for elbow x-ray 3 views: elbow pain    Comparison: injury films    Findings:   Essentially nondisplaced or minimally displaced radial head fracture of   this noted on 1 view.  No other injuries noted.    I personally reviewed the above x-rays and discussed with the patient.                Procedures            Assessment / Plan      Assessment/Plan:   Problem List Items Addressed This Visit        Musculoskeletal and Integument    Closed nondisplaced fracture of head of right radius       Other    Elbow stiffness, right      Other Visit Diagnoses     Fracture follow-up    -  Primary    Relevant Orders    XR Elbow 3+ View Right (Completed)          Selective rest/activity modifications recommended while the elbow recovers.    Counseling - I counseled the patient on the diagnosis and treatment plan.  All questions were answered.    No weight bearing on affected elbow; gradual    Encouraged self-exercises to preserve elbow range of motion,     I counseled that is takes time to heal up a fracture.  She is doing an excellent job of  reserving range of motion during this time will be critical.    Next visit may final visit given remarkable improvements      Follow Up:   Return in about 4 weeks (around 10/8/2020) for Follow-up with X-rays.      Konrad Lind MD, FAAOS  Orthopedic Surgeon  Fellowship Trained Shoulder and Elbow Surgeon  [facility]  Orthopedics and Sports Medicine  [address], [address]  [place] [address]    09/10/20  13:52    Please note that portions of this note may have been completed with a voice recognition program. Efforts were made to edit the dictations, but occasionally words are mistranscribed.

## 2020-09-16 ENCOUNTER — HOSPITAL ENCOUNTER (OUTPATIENT)
Dept: MAMMOGRAPHY | Facility: HOSPITAL | Age: 65
Discharge: HOME | End: 2020-09-16
Admitting: NURSE PRACTITIONER

## 2020-09-16 ENCOUNTER — TELEPHONE (OUTPATIENT)
Dept: INTERNAL MEDICINE | Facility: CLINIC | Age: 65
End: 2020-09-16

## 2020-09-16 DIAGNOSIS — R92.8 ABNORMAL MAMMOGRAPHY: ICD-10-CM

## 2020-09-16 PROCEDURE — 77062 BREAST TOMOSYNTHESIS BI: CPT | Performed by: RADIOLOGY

## 2020-09-16 PROCEDURE — 77066 DX MAMMO INCL CAD BI: CPT | Performed by: RADIOLOGY

## 2020-09-16 PROCEDURE — 77066 DX MAMMO INCL CAD BI: CPT

## 2020-09-16 PROCEDURE — G0279 TOMOSYNTHESIS, MAMMO: HCPCS

## 2020-09-16 NOTE — TELEPHONE ENCOUNTER
Patient scheduled today for diagnostic mamm.  Overdue result received for screening mamm.  Both tests were ordered on 9-4-20.  Will you please cancel the order for screening mamm?

## 2020-09-17 ENCOUNTER — OFFICE VISIT (OUTPATIENT)
Dept: ORTHOPEDIC SURGERY | Facility: CLINIC | Age: 65
End: 2020-09-17

## 2020-09-17 VITALS — HEART RATE: 86 BPM | HEIGHT: 60 IN | WEIGHT: 220.46 LBS | BODY MASS INDEX: 43.28 KG/M2 | OXYGEN SATURATION: 98 %

## 2020-09-17 DIAGNOSIS — M79.641 RIGHT HAND PAIN: Primary | ICD-10-CM

## 2020-09-17 PROCEDURE — 99213 OFFICE O/P EST LOW 20 MIN: CPT | Performed by: ORTHOPAEDIC SURGERY

## 2020-09-17 RX ORDER — INFLUENZA A VIRUS A/MICHIGAN/45/2015 X-275 (H1N1) ANTIGEN (FORMALDEHYDE INACTIVATED), INFLUENZA A VIRUS A/SINGAPORE/INFIMH-16-0019/2016 IVR-186 (H3N2) ANTIGEN (FORMALDEHYDE INACTIVATED), INFLUENZA B VIRUS B/PHUKET/3073/2013 ANTIGEN (FORMALDEHYDE INACTIVATED), AND INFLUENZA B VIRUS B/MARYLAND/15/2016 BX-69A ANTIGEN (FORMALDEHYDE INACTIVATED) 60; 60; 60; 60 UG/.7ML; UG/.7ML; UG/.7ML; UG/.7ML
INJECTION, SUSPENSION INTRAMUSCULAR
COMMUNITY
Start: 2020-09-15 | End: 2022-11-14

## 2020-09-17 NOTE — PROGRESS NOTES
[facility] Note        Subjective     Pain of the Right Hand      HPI    Lizabeth Rangel is a 64 y.o. female who presents with new problem of: right knee pain.  Onset: atraumatic and gradual in nature. The issue has been ongoing for 4 week(s). Pain is a 1/10 on the pain scale. Pain is described as sharp pain. Associated symptoms include pain. Previous treatments have included: nothing.    I have reviewed the following portions of the patient's history:History of Present Illnessand review of systems.      Patient is here today at the request of Dr. Lind for evaluation of right hand pain.  She fell about a month ago and landed directly on her outstretched hand.  She has been seeing Dr. Lind for a radial head fracture.  She tells me that she had immediate swelling in her right hand between the small and ring digits.  This is gotten better over time fortunately.  She has no catching or numbness or tingling.    Past Medical History:   Diagnosis Date   • Allergic rhinitis    • Ankle fracture 1996    and 1992 - left; surg rerpair    • Breast injury 08/26/2020    fell 3 weeks ago   • Gallstone pancreatitis 1997   • Perforated bowel (CMS/HCC) 1997   • Sleep apnea       Past Surgical History:   Procedure Laterality Date   • ANKLE ARTHROSCOPY  1992   • CATARACT EXTRACTION  2018   • CATARACT EXTRACTION  2017   • CHOLECYSTECTOMY  1997   • COLON RESECTION        Family History   Problem Relation Age of Onset   • Obesity Mother    • Alcohol abuse Father    • Obesity Father    • Breast cancer Maternal Aunt 30   • Ovarian cancer Neg Hx    • Endometrial cancer Neg Hx      Social History     Socioeconomic History   • Marital status:      Spouse name: Not on file   • Number of children: Not on file   • Years of education: Not on file   • Highest education level: Not on file   Tobacco Use   • Smoking status: Former Smoker     Packs/day: 2.00     Years: 40.00     Pack years: 80.00     Types:  Cigarettes     Quit date: 1989     Years since quittin.2   • Smokeless tobacco: Never Used   Substance and Sexual Activity   • Alcohol use: No   • Drug use: No   • Sexual activity: Defer   Social History Narrative    Caffeine: 8-10 servings of coffee in the morning      Current Outpatient Medications on File Prior to Visit   Medication Sig Dispense Refill   • fexofenadine (ALLEGRA) 60 MG tablet Take 60 mg by mouth Daily As Needed.     • fluticasone (FLONASE) 50 MCG/ACT nasal spray 2 sprays into the nostril(s) as directed by provider Daily As Needed for Allergies.     • Fluzone High-Dose Quadrivalent 0.7 ML suspension prefilled syringe PHARMACIST ADMINISTERED IMMUNIZATION ADMINISTERED AT TIME OF DISPENSING     • HYDROcodone-acetaminophen (NORCO) 5-325 MG per tablet Take 1 tablet by mouth Every 6 (Six) Hours As Needed (fracture). 12 tablet 0   • ketoconazole (NIZORAL) 2 % cream APPLY TO RASH UNDER BREAST BID.  TAPER USE AS CONDITION IMPROVES  2   • ketoconazole (NIZORAL) 2 % shampoo LATHER ON SCALP TWO TO THREE TIMES PER WEEK AND RINSE AFTER 5 MINUTES  3   • vitamin B-12 (CYANOCOBALAMIN) 1000 MCG tablet Take 1 tablet by mouth Daily. (Patient taking differently: Take 1,000 mcg by mouth 3 (Three) Times a Week.) 90 tablet 3   • vitamin D (ERGOCALCIFEROL) 69995 units capsule capsule Take 1 capsule by mouth 1 (One) Time Per Week. 12 capsule 3     No current facility-administered medications on file prior to visit.       Allergies   Allergen Reactions   • Thimerosal Itching     Itchy red eyes           Review of Systems   Constitutional: Negative.    HENT: Negative.    Eyes: Negative.    Respiratory: Negative.    Cardiovascular: Negative.    Gastrointestinal: Negative.    Endocrine: Negative.    Genitourinary: Negative.    Musculoskeletal: Positive for arthralgias.   Skin: Negative.    Allergic/Immunologic: Negative.    Neurological: Negative.    Hematological: Negative.    Psychiatric/Behavioral: Negative.      "    I reviewed the patient's chief complaint, history of present illness, review of systems, past medical history, surgical history, family history, social history, medications and allergy list.        Objective      Physical Exam  Pulse 86   Ht 152.4 cm (60\")   Wt 100 kg (220 lb 7.4 oz)   SpO2 98%   BMI 43.06 kg/m²     Body mass index is 43.06 kg/m².    General  Mental Status - alert  General Appearance - cooperative, well groomed, not in acute distress  Orientation - Oriented X3  Build & Nutrition - well developed and well nourished  Posture - normal posture  Gait - normal gait     Integumentary  Global Assessment  Examination of related systems reveals - no lymphadenopathy  Ears:  No abnormality  Nose:  No mucous drainage  General Characteristics  Overall examination of the patient's skin reveals - no rashes, no evidence of scars, no suspicious lesions and no bruises.  Color - normal coloration of skin.  Vascular: Brisk capillary refill in all extremities    Ortho Exam  Right hand: Patient has full tip to palm flexion of full extension of her digits  There is a resolving area of ecchymosis and swelling in between the volar aspect of the hand between the metacarpal heads of the small and ring digits.  There is no clear mass palpable.  There is no reproducible locking or catching of the small and ring digits.  The A1 pulleys are nontender to palpation.    Imaging/Studies  Imaging Results (Last 24 Hours)     Procedure Component Value Units Date/Time    XR Hand 3+ View Right [[id number]] Resulted: 09/17/20 1643     Updated: 09/17/20 1644    Narrative:      Right Hand X-Ray    Indication: Pain    Views:  AP, Lateral, and Oblique     Comparison: None    Findings:  No fracture  There are degenerative changes seen throughout the IP joints that are the   worst at the small digit DIP joint.  There is a degenerative appearing   cyst at the base of the thumb distal phalanx and at the waist of the   scaphoid..  Normal soft " tissues  Normal joint spaces    Impression:   Degenerative changes right hand.  No acute bony abnormalities.              Assessment    Assessment:  1. Right hand pain        Plan:  1. Continue over-the-counter medication as needed for discomfort  2. Right hand pain after a fall--x-rays are negative.  There does not appear to be a traumatic tenosynovitis.  She likely has a contusion and a small hematoma that is resolving.  Plan will be for observation.  She is already essentially well and should not need further follow-up for this problem.        Se Hung MD  09/17/20  17:22 EDT    Dragon disclaimer:  Much of this encounter note is an electronic transcription/translation of spoken language to printed text. The electronic translation of spoken language may permit erroneous, or at times, nonsensical words or phrases to be inadvertently transcribed; Although I have reviewed the note for such errors, some may still exist.

## 2020-10-08 ENCOUNTER — OFFICE VISIT (OUTPATIENT)
Dept: ORTHOPEDIC SURGERY | Facility: CLINIC | Age: 65
End: 2020-10-08

## 2020-10-08 VITALS — OXYGEN SATURATION: 99 % | BODY MASS INDEX: 43.28 KG/M2 | HEART RATE: 88 BPM | WEIGHT: 220.46 LBS | HEIGHT: 60 IN

## 2020-10-08 DIAGNOSIS — S52.124D CLOSED NONDISPLACED FRACTURE OF HEAD OF RIGHT RADIUS WITH ROUTINE HEALING, SUBSEQUENT ENCOUNTER: Primary | ICD-10-CM

## 2020-10-08 DIAGNOSIS — Z09 FRACTURE FOLLOW-UP: ICD-10-CM

## 2020-10-08 PROCEDURE — 99212 OFFICE O/P EST SF 10 MIN: CPT | Performed by: ORTHOPAEDIC SURGERY

## 2020-10-08 NOTE — PROGRESS NOTES
[facility] Follow Up       Office Follow Up Visit       Patient Name: Lizabeth Rangel    Chief Complaint:   Chief Complaint   Patient presents with   • Follow-up     3 week f/u--Closed nondisplaced fracture of head of right radius, 8/19/20        Referring Physician: No ref. provider found    History of Present Illness:   It has been 3  week(s) since Lizabeth Rangel's last visit. Lizabeth Rangel returns to clinic today for F/U: follow-up of rightBody Part: elbowReason: fracture. The issue has been ongoing for 7 week(s). Lizabeth Rangel rates HIS/HER: herpain at 1/10 on the pain scale. Previous/current treatments: NSAIDS. Current symptoms:Symptoms: none.  Overall, he/she: sheis doing the same. I have reviewed the patient's history of present illness as noted/entered above.    I have reviewed the patient's past medical history, surgical history, social history, family history, medications, and allergies as noted in the electronic medical record and as noted/entered.  I have reviewed the patient's review of systems as noted/enter and updated as noted in the patient's HPI.    RIGHT elbow doing better  She is pleased    Subjective   Subjective      Review of Systems   Constitutional: Negative.  Negative for chills, fatigue and fever.   HENT: Negative.  Negative for congestion and dental problem.    Eyes: Negative.  Negative for blurred vision.   Respiratory: Negative.  Negative for shortness of breath.    Cardiovascular: Negative.  Negative for leg swelling.   Gastrointestinal: Negative.  Negative for abdominal pain.   Endocrine: Negative.  Negative for polyuria.   Genitourinary: Negative.  Negative for difficulty urinating.   Musculoskeletal: Positive for arthralgias.   Skin: Negative.    Allergic/Immunologic: Negative.    Neurological: Negative.    Hematological: Negative.  Negative for adenopathy.   Psychiatric/Behavioral:  Negative.  Negative for behavioral problems.        Past Medical History:   Past Medical History:   Diagnosis Date   • Allergic rhinitis    • Ankle fracture     and  - left; surg rerpair    • Breast injury 2020    fell 3 weeks ago   • Gallstone pancreatitis    • Perforated bowel (CMS/HCC)    • Sleep apnea        Past Surgical History:   Past Surgical History:   Procedure Laterality Date   • ANKLE ARTHROSCOPY     • CATARACT EXTRACTION     • CATARACT EXTRACTION     • CHOLECYSTECTOMY     • COLON RESECTION         Family History:   Family History   Problem Relation Age of Onset   • Obesity Mother    • Alcohol abuse Father    • Obesity Father    • Breast cancer Maternal Aunt 30   • Ovarian cancer Neg Hx    • Endometrial cancer Neg Hx        Social History:   Social History     Socioeconomic History   • Marital status:      Spouse name: Not on file   • Number of children: Not on file   • Years of education: Not on file   • Highest education level: Not on file   Tobacco Use   • Smoking status: Former Smoker     Packs/day: 2.00     Years: 40.00     Pack years: 80.00     Types: Cigarettes     Quit date: 1989     Years since quittin.3   • Smokeless tobacco: Never Used   Substance and Sexual Activity   • Alcohol use: No   • Drug use: No   • Sexual activity: Defer   Social History Narrative    Caffeine: 8-10 servings of coffee in the morning       Medications:   Current Outpatient Medications:   •  fexofenadine (ALLEGRA) 60 MG tablet, Take 60 mg by mouth Daily As Needed., Disp: , Rfl:   •  fluticasone (FLONASE) 50 MCG/ACT nasal spray, 2 sprays into the nostril(s) as directed by provider Daily As Needed for Allergies., Disp: , Rfl:   •  Fluzone High-Dose Quadrivalent 0.7 ML suspension prefilled syringe, PHARMACIST ADMINISTERED IMMUNIZATION ADMINISTERED AT TIME OF DISPENSING, Disp: , Rfl:   •  HYDROcodone-acetaminophen (NORCO) 5-325 MG per tablet, Take 1 tablet by mouth Every  "6 (Six) Hours As Needed (fracture)., Disp: 12 tablet, Rfl: 0  •  ketoconazole (NIZORAL) 2 % cream, APPLY TO RASH UNDER BREAST BID.  TAPER USE AS CONDITION IMPROVES, Disp: , Rfl: 2  •  ketoconazole (NIZORAL) 2 % shampoo, LATHER ON SCALP TWO TO THREE TIMES PER WEEK AND RINSE AFTER 5 MINUTES, Disp: , Rfl: 3  •  vitamin B-12 (CYANOCOBALAMIN) 1000 MCG tablet, Take 1 tablet by mouth Daily. (Patient taking differently: Take 1,000 mcg by mouth 3 (Three) Times a Week.), Disp: 90 tablet, Rfl: 3  •  vitamin D (ERGOCALCIFEROL) 61092 units capsule capsule, Take 1 capsule by mouth 1 (One) Time Per Week., Disp: 12 capsule, Rfl: 3    Allergies:   Allergies   Allergen Reactions   • Thimerosal Itching     Itchy red eyes        The following portions of the patient's history were reviewed and updated as appropriate: allergies, current medications, past family history, past medical history, past social history, past surgical history and problem list.        Objective    Objective      Vital Signs:   Vitals:    10/08/20 1321   Pulse: 88   SpO2: 99%   Weight: 100 kg (220 lb 7.4 oz)   Height: 152.4 cm (60\")       Ortho Exam:  RIGHT elbow ROM essentially full ROM  Pain free  No crepitus  Excellent pro/supination  No tenderness to palpation    Results Review:  Imaging Results (Last 24 Hours)     Procedure Component Value Units Date/Time    XR Elbow 3+ View Right [[id number]] Resulted: 10/08/20 1330     Updated: 10/08/20 1330    Narrative:      Imaging: elbow x-rays 3 views - AP, lateral, and oblique (radial head   view) elbow x-ray views    Side: RIGHT    Indication for elbow x-ray 3 views: elbow pain    Comparison: Prior clinic films comparison    Findings:   Right elbow 3 views.  The minimally displaced small radial head fracture   is noted on the AP view.  Stable compared to prior films    I personally reviewed the above x-rays and discussed with the patient.                Procedures            Assessment / Plan      Assessment/Plan: "   Problem List Items Addressed This Visit        Musculoskeletal and Integument    Closed nondisplaced fracture of head of right radius - Primary      Other Visit Diagnoses     Fracture follow-up        Relevant Orders    XR Elbow 3+ View Right (Completed)          Counseled on additional recovery over the next 3 to 4 weeks but given that she is pain-free full active and passive range of motion I do think she can transition to a PRN follow-up basis.  She will transition with more weightbearing related activities over the next 3 to 4 weeks as tolerated.  She is very pleased with the improvements    Follow Up:   As needed    Konrad Lind MD, FAAOS  Orthopedic Surgeon  Fellowship Trained Shoulder and Elbow Surgeon  [facility]  Orthopedics and Sports Medicine  [address] [place] [address], [address]  [place] [address]    10/08/20  13:49 EDT    Please note that portions of this note may have been completed with a voice recognition program. Efforts were made to edit the dictations, but occasionally words are mistranscribed.

## 2021-06-08 DIAGNOSIS — E55.9 VITAMIN D DEFICIENCY: ICD-10-CM

## 2021-06-08 DIAGNOSIS — E53.8 B12 DEFICIENCY: ICD-10-CM

## 2021-06-08 RX ORDER — ERGOCALCIFEROL 1.25 MG/1
50000 CAPSULE ORAL WEEKLY
Qty: 12 CAPSULE | Refills: 3 | Status: SHIPPED | OUTPATIENT
Start: 2021-06-08 | End: 2022-11-16

## 2021-06-08 RX ORDER — LANOLIN ALCOHOL/MO/W.PET/CERES
1000 CREAM (GRAM) TOPICAL DAILY
Qty: 90 TABLET | Refills: 3 | Status: SHIPPED | OUTPATIENT
Start: 2021-06-08 | End: 2023-02-13 | Stop reason: SDUPTHER

## 2021-06-16 ENCOUNTER — APPOINTMENT (OUTPATIENT)
Dept: CT IMAGING | Facility: HOSPITAL | Age: 66
End: 2021-06-16

## 2021-09-24 ENCOUNTER — TRANSCRIBE ORDERS (OUTPATIENT)
Dept: ADMINISTRATIVE | Facility: HOSPITAL | Age: 66
End: 2021-09-24

## 2021-09-24 DIAGNOSIS — Z12.31 VISIT FOR SCREENING MAMMOGRAM: Primary | ICD-10-CM

## 2021-10-26 ENCOUNTER — APPOINTMENT (OUTPATIENT)
Dept: MAMMOGRAPHY | Facility: HOSPITAL | Age: 66
End: 2021-10-26

## 2021-12-15 ENCOUNTER — TELEPHONE (OUTPATIENT)
Dept: INTERNAL MEDICINE | Facility: CLINIC | Age: 66
End: 2021-12-15

## 2021-12-15 ENCOUNTER — HOSPITAL ENCOUNTER (OUTPATIENT)
Dept: MAMMOGRAPHY | Facility: HOSPITAL | Age: 66
Discharge: HOME | End: 2021-12-15

## 2021-12-15 DIAGNOSIS — Z12.31 VISIT FOR SCREENING MAMMOGRAM: ICD-10-CM

## 2021-12-15 DIAGNOSIS — N63.20 LUMP OF LEFT BREAST: ICD-10-CM

## 2021-12-15 DIAGNOSIS — R92.8 ABNORMAL MAMMOGRAM: Primary | ICD-10-CM

## 2021-12-15 NOTE — TELEPHONE ENCOUNTER
Caller: Lizabeth Rangel    Relationship: Self    Best call back number: [phone]    What orders are you requesting (i.e. lab or imaging): DIAGNOSTIC MAMMOGRAM FOR LEFT BREAST    In what timeframe would the patient need to come in: ASAP    Where will you receive your lab/imaging services: [facility]    Additional notes: PATIENT WOULD LIKE TO KNOW IF SHE COULD BE SCHEDULED AT THE LOCATION WHERE SHE CAN BE SEEN SOONEST PLEASE

## 2021-12-22 ENCOUNTER — TELEPHONE (OUTPATIENT)
Dept: INTERNAL MEDICINE | Facility: CLINIC | Age: 66
End: 2021-12-22

## 2021-12-22 NOTE — TELEPHONE ENCOUNTER
Caller: Lizabeth Rangel    Relationship: Self    Best call back number: [phone]    What is the medical concern/diagnosis: LUMP IN LEFT BREAST    What specialty or service is being requested: DIAGNOSTIC MAMMOGRAM    What is the provider, practice or medical service name:  [facility]     What is the office location: [place]    What is the office phone number: [phone]    Any additional details: PATIENT ADVISED THAT [facility] COULDN'T GET HER IN UNTIL FEB 2022 AND SHE DOESN'T WANT TO WAIT THAT LONG

## 2022-02-24 DIAGNOSIS — N63.20 LUMP OF LEFT BREAST: ICD-10-CM

## 2022-11-14 ENCOUNTER — LAB (OUTPATIENT)
Dept: LAB | Facility: HOSPITAL | Age: 67
End: 2022-11-14

## 2022-11-14 ENCOUNTER — OFFICE VISIT (OUTPATIENT)
Dept: INTERNAL MEDICINE | Facility: CLINIC | Age: 67
End: 2022-11-14

## 2022-11-14 VITALS
BODY MASS INDEX: 39.3 KG/M2 | DIASTOLIC BLOOD PRESSURE: 64 MMHG | HEART RATE: 72 BPM | TEMPERATURE: 97.7 F | WEIGHT: 200.2 LBS | SYSTOLIC BLOOD PRESSURE: 110 MMHG | HEIGHT: 60 IN

## 2022-11-14 DIAGNOSIS — I27.20 PULMONARY HYPERTENSION: ICD-10-CM

## 2022-11-14 DIAGNOSIS — Z13.1 DIABETES MELLITUS SCREENING: ICD-10-CM

## 2022-11-14 DIAGNOSIS — Z13.29 THYROID DISORDER SCREENING: ICD-10-CM

## 2022-11-14 DIAGNOSIS — E55.9 VITAMIN D DEFICIENCY: ICD-10-CM

## 2022-11-14 DIAGNOSIS — Z00.00 WELLNESS EXAMINATION: ICD-10-CM

## 2022-11-14 DIAGNOSIS — Z13.0 SCREENING FOR DEFICIENCY ANEMIA: ICD-10-CM

## 2022-11-14 DIAGNOSIS — Z13.220 SCREENING CHOLESTEROL LEVEL: ICD-10-CM

## 2022-11-14 DIAGNOSIS — E53.8 B12 DEFICIENCY: ICD-10-CM

## 2022-11-14 DIAGNOSIS — R91.1 SOLITARY PULMONARY NODULE: ICD-10-CM

## 2022-11-14 DIAGNOSIS — Z12.11 ENCOUNTER FOR COLORECTAL CANCER SCREENING: ICD-10-CM

## 2022-11-14 DIAGNOSIS — Z12.31 ENCOUNTER FOR SCREENING MAMMOGRAM FOR MALIGNANT NEOPLASM OF BREAST: Primary | ICD-10-CM

## 2022-11-14 DIAGNOSIS — Z78.0 POSTMENOPAUSE: ICD-10-CM

## 2022-11-14 DIAGNOSIS — K30 INDIGESTION: ICD-10-CM

## 2022-11-14 DIAGNOSIS — Z12.12 ENCOUNTER FOR COLORECTAL CANCER SCREENING: ICD-10-CM

## 2022-11-14 LAB
25(OH)D3 SERPL-MCNC: 12.4 NG/ML (ref 30–100)
ALBUMIN SERPL-MCNC: 3.7 G/DL (ref 3.5–5.2)
ALBUMIN UR-MCNC: <1.2 MG/DL
ALBUMIN/GLOB SERPL: 1.1 G/DL
ALP SERPL-CCNC: 120 U/L (ref 39–117)
ALT SERPL W P-5'-P-CCNC: 17 U/L (ref 1–33)
ANION GAP SERPL CALCULATED.3IONS-SCNC: 10.9 MMOL/L (ref 5–15)
AST SERPL-CCNC: 23 U/L (ref 1–32)
BASOPHILS # BLD AUTO: 0.08 10*3/MM3 (ref 0–0.2)
BASOPHILS NFR BLD AUTO: 1.5 % (ref 0–1.5)
BILIRUB SERPL-MCNC: 0.4 MG/DL (ref 0–1.2)
BUN SERPL-MCNC: 11 MG/DL (ref 8–23)
BUN/CREAT SERPL: 14.3 (ref 7–25)
CALCIUM SPEC-SCNC: 9.2 MG/DL (ref 8.6–10.5)
CHLORIDE SERPL-SCNC: 102 MMOL/L (ref 98–107)
CHOLEST SERPL-MCNC: 276 MG/DL (ref 0–200)
CO2 SERPL-SCNC: 26.1 MMOL/L (ref 22–29)
CREAT SERPL-MCNC: 0.77 MG/DL (ref 0.57–1)
CREAT UR-MCNC: 114.9 MG/DL
DEPRECATED RDW RBC AUTO: 37.8 FL (ref 37–54)
EGFRCR SERPLBLD CKD-EPI 2021: 84.7 ML/MIN/1.73
EOSINOPHIL # BLD AUTO: 0.1 10*3/MM3 (ref 0–0.4)
EOSINOPHIL NFR BLD AUTO: 1.9 % (ref 0.3–6.2)
ERYTHROCYTE [DISTWIDTH] IN BLOOD BY AUTOMATED COUNT: 11.9 % (ref 12.3–15.4)
GLOBULIN UR ELPH-MCNC: 3.5 GM/DL
GLUCOSE SERPL-MCNC: 98 MG/DL (ref 65–99)
HBA1C MFR BLD: 5.8 % (ref 4.8–5.6)
HCT VFR BLD AUTO: 45.3 % (ref 34–46.6)
HDLC SERPL-MCNC: 65 MG/DL (ref 40–60)
HGB BLD-MCNC: 15.1 G/DL (ref 12–15.9)
IMM GRANULOCYTES # BLD AUTO: 0.02 10*3/MM3 (ref 0–0.05)
IMM GRANULOCYTES NFR BLD AUTO: 0.4 % (ref 0–0.5)
LDLC SERPL CALC-MCNC: 181 MG/DL (ref 0–100)
LDLC/HDLC SERPL: 2.74 {RATIO}
LYMPHOCYTES # BLD AUTO: 1.6 10*3/MM3 (ref 0.7–3.1)
LYMPHOCYTES NFR BLD AUTO: 30.2 % (ref 19.6–45.3)
MCH RBC QN AUTO: 29 PG (ref 26.6–33)
MCHC RBC AUTO-ENTMCNC: 33.3 G/DL (ref 31.5–35.7)
MCV RBC AUTO: 87.1 FL (ref 79–97)
MICROALBUMIN/CREAT UR: NORMAL MG/G{CREAT}
MONOCYTES # BLD AUTO: 0.52 10*3/MM3 (ref 0.1–0.9)
MONOCYTES NFR BLD AUTO: 9.8 % (ref 5–12)
NEUTROPHILS NFR BLD AUTO: 2.97 10*3/MM3 (ref 1.7–7)
NEUTROPHILS NFR BLD AUTO: 56.2 % (ref 42.7–76)
NRBC BLD AUTO-RTO: 0.2 /100 WBC (ref 0–0.2)
PLATELET # BLD AUTO: 251 10*3/MM3 (ref 140–450)
PMV BLD AUTO: 11.2 FL (ref 6–12)
POTASSIUM SERPL-SCNC: 4.5 MMOL/L (ref 3.5–5.2)
PROT SERPL-MCNC: 7.2 G/DL (ref 6–8.5)
RBC # BLD AUTO: 5.2 10*6/MM3 (ref 3.77–5.28)
SODIUM SERPL-SCNC: 139 MMOL/L (ref 136–145)
TRIGL SERPL-MCNC: 164 MG/DL (ref 0–150)
TSH SERPL DL<=0.05 MIU/L-ACNC: 3.28 UIU/ML (ref 0.27–4.2)
VIT B12 BLD-MCNC: 341 PG/ML (ref 211–946)
VLDLC SERPL-MCNC: 30 MG/DL (ref 5–40)
WBC NRBC COR # BLD: 5.29 10*3/MM3 (ref 3.4–10.8)

## 2022-11-14 PROCEDURE — 82043 UR ALBUMIN QUANTITATIVE: CPT

## 2022-11-14 PROCEDURE — 84443 ASSAY THYROID STIM HORMONE: CPT

## 2022-11-14 PROCEDURE — 82570 ASSAY OF URINE CREATININE: CPT

## 2022-11-14 PROCEDURE — 82607 VITAMIN B-12: CPT

## 2022-11-14 PROCEDURE — 80061 LIPID PANEL: CPT

## 2022-11-14 PROCEDURE — 90750 HZV VACC RECOMBINANT IM: CPT | Performed by: NURSE PRACTITIONER

## 2022-11-14 PROCEDURE — 99397 PER PM REEVAL EST PAT 65+ YR: CPT | Performed by: NURSE PRACTITIONER

## 2022-11-14 PROCEDURE — 83036 HEMOGLOBIN GLYCOSYLATED A1C: CPT

## 2022-11-14 PROCEDURE — 85025 COMPLETE CBC W/AUTO DIFF WBC: CPT

## 2022-11-14 PROCEDURE — 90471 IMMUNIZATION ADMIN: CPT | Performed by: NURSE PRACTITIONER

## 2022-11-14 PROCEDURE — 80053 COMPREHEN METABOLIC PANEL: CPT

## 2022-11-14 PROCEDURE — 82306 VITAMIN D 25 HYDROXY: CPT

## 2022-11-14 NOTE — PROGRESS NOTES
Female Physical Exam     Patient Name: Lizabeth Rangel  : 1955   MRN: [id number]   Care Team: Patient Care Team:  Holli Kaur APRN as PCP - General (Internal Medicine)    Chief Complaint:    Chief Complaint   Patient presents with   • Annual Exam     Fasting        History of Present Illness: Lizabeth Rangel is a 66 y.o. female presents to the clinic for a physical.    She notes she is well. She denies any issues with her breathing or eating. She notes having a CPAP; however, does not use it currently. She notes she tracks her sleep and is receiving an optimal amount of sleep.    The patient states she had felt an abnormality on her left breast and had already scheduled a mammogram prior to noting the abnormality. When she arrived she told them she had something else she needed to be evaluated for. At the time, only her left breast was checked, and she is unsure why it shows her as a missed appointment. She states she normally has her mammograms at [facility]; however, has the last one performed at [facility].    She notes she drinks black coffee and has been experiencing an intense burning in her stomach, almost to the point of vomiting. She states this occurred years ago when she had to proceed with endoscopy in .  She associates the burning with her drinking coffee and added she will not give up coffee. She reports the burning sensation occurs almost daily. She denies taking any medication for ulcers. Later, she states she would stop drinking coffee as opposed to taking medication. She reports not taking ibuprofen in some time.    She denies taking her B12 supplements recently.    The patient denies any issues with her bowels or bladder. She acknowledges she is due for a colonoscopy. The patient notes she would like to return to see a Physician she used previously at [facility].    She inquires if she is due for a DEXA  scan.    She notes she is performing exercise by doing ambulation. She adds she has lost weight since her last appointment. She notes an approximate 77-pound weight loss. She states she is going to add weights to her daily routine.    The patient states she has seen Dr. Perez previously for lung cancer screenings and for noted pulmonary nodule and pulmonary hypertension.     She notes being up to date on her vision and dental screenings. She states she will return to the clinic for a Pap smear and believes her last one was performed in 2018.    She notes she has had all COVID-19, influenza, and pneumonia vaccinations. She denies having the shingles vaccination and adds she has had shingles in the past. She agrees to have the vaccination performed prior to leaving the clinic.    She states she typically has yearly follow-ups.       Subjective        Past Medical History:   Past Medical History:   Diagnosis Date   • Allergic rhinitis    • Ankle fracture     and  - left; surg rerpair    • Breast injury 2020    fell 3 weeks ago   • Gallstone pancreatitis    • Perforated bowel (HCC)    • Sleep apnea        Past Surgical History:   Past Surgical History:   Procedure Laterality Date   • ANKLE ARTHROSCOPY     • CATARACT EXTRACTION     • CATARACT EXTRACTION     • CHOLECYSTECTOMY     • COLON RESECTION         Family History:   Family History   Problem Relation Age of Onset   • Obesity Mother    • Alcohol abuse Father    • Obesity Father    • Breast cancer Maternal Aunt 30   • Ovarian cancer Neg Hx    • Endometrial cancer Neg Hx        Social History:   Social History     Socioeconomic History   • Marital status:    Tobacco Use   • Smoking status: Former     Packs/day: 2.00     Years: 40.00     Pack years: 80.00     Types: Cigarettes     Quit date: 1989     Years since quittin.4   • Smokeless tobacco: Never   Substance and Sexual Activity   • Alcohol use: No   • Drug  use: No   • Sexual activity: Defer       Tobacco History:   Social History     Tobacco Use   Smoking Status Former   • Packs/day: 2.00   • Years: 40.00   • Pack years: 80.00   • Types: Cigarettes   • Quit date: 1989   • Years since quittin.4   Smokeless Tobacco Never       Medications:     Current Outpatient Medications:   •  fexofenadine (ALLEGRA) 60 MG tablet, Take 60 mg by mouth Daily As Needed., Disp: , Rfl:   •  fluticasone (FLONASE) 50 MCG/ACT nasal spray, 2 sprays into the nostril(s) as directed by provider Daily As Needed for Allergies., Disp: , Rfl:   •  ketoconazole (NIZORAL) 2 % cream, APPLY TO RASH UNDER BREAST BID.  TAPER USE AS CONDITION IMPROVES   PRN, Disp: , Rfl: 2  •  ketoconazole (NIZORAL) 2 % shampoo, LATHER ON SCALP TWO TO THREE TIMES PER WEEK AND RINSE AFTER 5 MINUTES    PRN, Disp: , Rfl: 3  •  vitamin B-12 (CYANOCOBALAMIN) 1000 MCG tablet, Take 1 tablet by mouth Daily. (Patient taking differently: Take 1 tablet by mouth Daily. When remembered), Disp: 90 tablet, Rfl: 3  •  vitamin D (ERGOCALCIFEROL) 1.25 MG (97139 UT) capsule capsule, Take 1 capsule by mouth 1 (One) Time Per Week. (Patient taking differently: Take 1 capsule by mouth 1 (One) Time Per Week. When remembered), Disp: 12 capsule, Rfl: 3  •  Fluzone High-Dose Quadrivalent 0.7 ML suspension prefilled syringe, PHARMACIST ADMINISTERED IMMUNIZATION ADMINISTERED AT TIME OF DISPENSING, Disp: , Rfl:     Allergies:   Allergies   Allergen Reactions   • Thimerosal Itching     Itchy red eyes        Past Medical History, Social History, Family History and Care Team were all reviewed with patient and updated as appropriate.     Immunizations:  Td/Tdap(Booster Q 10 yrs): Current  Flu (Yearly): Current  Pneumovax (1 yr after Prevnar): Current  [id number] (1 yr after Pneumo): Current  Colorectal Screening:     Last Completed Colonoscopy     This patient has no relevant Health Maintenance data.      Recommended at this time  Pap:    Last  "Completed Pap Smear     This patient has no relevant Health Maintenance data.      2018, recommended at this time  Mammogram:    Last Completed Mammogram          Ordered - MAMMOGRAM (Every 2 Years) Ordered on 2022  SCANNED - MAMMO    2022  SCANNED - MAMMO    2022  Mammo Diagnostic Digital Tomosynthesis Left With CAD    2020  Mammo Diagnostic Digital Tomosynthesis Bilateral With CAD    2019  Mammo Diagnostic Digital Tomosynthesis Bilateral With CAD    Only the first 5 history entries have been loaded, but more history exists.                 CT for Smoker (Age 55-75, 30pk yr): Deferred to pulmonology  Bone Density/DEXA: DEXA ordered, last completed   Hep C ( [id number]): Previously performed, negative    Diet/Physical activity: Patient walking at least 10,000 steps daily, healthy diet and states that she is \"avoiding all sugar\".        Depression: PHQ-2 Depression Screening  Little interest or pleasure in doing things? 0-->not at all   Feeling down, depressed, or hopeless? 0-->not at all   PHQ-2 Total Score 0     Intimate partner violence: (Screen on initial visit, pregnant women, women with injuries, older adult with injury or evidence of neglect):  Violence can be a problem in many people's lives, so I now ask every patient about trauma or abuse they may have experienced in a relationship.  Stress/Safety - Do you feel safe in your relationship?  Afraid/Abused - Have you ever been in a relationship where you were threatened, hurt, or afraid?  Friend/Family - Are your friends aware you have been hurt?  Emergency Plan - Do you have a safe place to go and the resources you need in an emergency?    Osteoporosis:   Ost menopausal women < 65 with RF (advancing age, previous fracture, glucocorticoid therapy, parental hip fracture, low body weight, current cigarette smoking, excessive alcohol consumption, rheumatoid arthritis, secondary osteoporosis [hypogonadism/premature " "menopause, malabsorption, chronic liver disease, IBD]).  All women 65 or older    Objective     Physical Exam:  Vital Signs:   Vitals:    11/14/22 0811 11/14/22 0845   BP: 132/90 110/64   BP Location: Left arm    Patient Position: Sitting    Cuff Size: Large Adult    Pulse: 72    Temp: 97.7 °F (36.5 °C)    TempSrc: Temporal    Weight: 90.8 kg (200 lb 3.2 oz)    Height: 152.5 cm (60.04\")    PainSc: 0-No pain      Body mass index is 39.05 kg/m².     Physical Exam  Vitals and nursing note reviewed.   Constitutional:       General: She is not in acute distress.  HENT:      Head: Normocephalic and atraumatic.      Right Ear: Tympanic membrane, ear canal and external ear normal.      Left Ear: Tympanic membrane, ear canal and external ear normal.      Mouth/Throat:      Mouth: Mucous membranes are moist.      Pharynx: Oropharynx is clear. No oropharyngeal exudate.   Eyes:      General: No scleral icterus.     Conjunctiva/sclera: Conjunctivae normal.   Neck:      Vascular: No carotid bruit.   Cardiovascular:      Rate and Rhythm: Normal rate and regular rhythm.   Pulmonary:      Effort: Pulmonary effort is normal. No respiratory distress.      Breath sounds: No wheezing.   Abdominal:      General: Bowel sounds are normal.      Palpations: There is no mass.      Tenderness: There is abdominal tenderness (Midepigastric).      Hernia: No hernia is present.   Musculoskeletal:      Cervical back: Neck supple. No tenderness.      Right lower leg: No edema.      Left lower leg: No edema.   Skin:     General: Skin is warm and dry.   Neurological:      General: No focal deficit present.      Mental Status: She is alert.      Gait: Gait normal.   Psychiatric:         Mood and Affect: Mood normal.         Thought Content: Thought content normal.         Judgment: Judgment normal.         Assessment / Plan      Assessment/Plan:   Problems Addressed This Visit    ICD-10-CM ICD-9-CM   1. Encounter for screening mammogram for malignant " neoplasm of breast  Z12.31 V76.12   2. Indigestion  K30 536.8   3. Screening cholesterol level  Z13.220 V77.91   4. Thyroid disorder screening  Z13.29 V77.0   5. Screening for deficiency anemia  Z13.0 V78.1   6. Diabetes mellitus screening  Z13.1 V77.1   7. B12 deficiency  E53.8 266.2   8. Vitamin D deficiency  E55.9 268.9   9. Solitary pulmonary nodule  R91.1 793.11   10. Pulmonary hypertension (HCC)  I27.20 416.8   11. Postmenopause  Z78.0 V49.81   12. Encounter for colorectal cancer screening  Z12.11 V76.51    Z12.12 V76.41   13. Wellness examination  Z00.00 V70.0     Wellness exam  -Review medical history, social history, need for immunizations and screenings  -Screening labs including TSH, CBC, CMP, lipids, MA to    -Schedule screening DEXA    -Referral for colonoscopy    -Referral for screening mammography    -Patient is UTD on vision and dental    -Encourage patient to continue routine exercise, healthy diet and weight management    -Shingrix vaccine administered today.  She will need to follow-up for second dose    -Discussed need for Pap smear, she may schedule an appointment to have this performed in the near future    -Provided education on preventative care for 65 years and older, female    Pulmonary hypertension/history of pulmonary nodule  CAREN with noncompliance with CPAP  -Referral back to pulmonary for management    Vitamin D deficiency   -Vitamin D ordered    B12 deficiency  -B12 ordered    Midepigastric tenderness  -Suspect likely GERD  -We have discussed use of PPI such as omeprazole but patient declined  -Patient will plan to avoid triggers such as caffeine  -Advised to follow-up if symptoms persist or worsen  -Provided patient education on GERD    Gastritis  - She agrees to discontinue coffee and if there is no improvement in 2 weeks, she will notify the clinic.      plan of care reviewed with patient at the conclusion of today's visit. Education was provided regarding diagnosis and management.   Patient verbalizes understanding of and agreement with management plan.    Follow Up:   Return in about 1 year (around 11/14/2023) for Annual physical.        VALDEZ Gordon  [facility] [address] [facility]    Please note that portions of this note were completed with a voice recognition program.    Transcribed from ambient dictation for VALDEZ Diaz by Viviana Oconnell.  11/14/22   09:56 EST    Patient or patient representative verbalized consent to the visit recording.  I have personally performed the services described in this document as transcribed by the above individual, and it is both accurate and complete.

## 2022-11-16 RX ORDER — ERGOCALCIFEROL 1.25 MG/1
50000 CAPSULE ORAL WEEKLY
Qty: 6 CAPSULE | Refills: 0 | Status: SHIPPED | OUTPATIENT
Start: 2022-11-16 | End: 2022-12-22

## 2022-12-30 ENCOUNTER — OFFICE VISIT (OUTPATIENT)
Dept: ORTHOPEDIC SURGERY | Facility: CLINIC | Age: 67
End: 2022-12-30

## 2022-12-30 VITALS
SYSTOLIC BLOOD PRESSURE: 136 MMHG | WEIGHT: 195.8 LBS | BODY MASS INDEX: 38.44 KG/M2 | HEIGHT: 60 IN | DIASTOLIC BLOOD PRESSURE: 82 MMHG

## 2022-12-30 DIAGNOSIS — Z98.890 S/P ORIF (OPEN REDUCTION INTERNAL FIXATION) FRACTURE: Primary | ICD-10-CM

## 2022-12-30 DIAGNOSIS — Z87.81 S/P ORIF (OPEN REDUCTION INTERNAL FIXATION) FRACTURE: Primary | ICD-10-CM

## 2022-12-30 DIAGNOSIS — M79.672 LEFT FOOT PAIN: ICD-10-CM

## 2022-12-30 PROCEDURE — 99214 OFFICE O/P EST MOD 30 MIN: CPT | Performed by: ORTHOPAEDIC SURGERY

## 2022-12-30 NOTE — PATIENT INSTRUCTIONS
"Properly Fitting Comfortable Shoes    \"The patient is the best  of what constitutes a comfortable shoe.\"    The following are shoe characteristics that typically make a shoe comfortable to wear:    A Well-Fitted Shoe  Shoes not only need to be the correct length to fit our foot, but they also must be the correct width. Shoe manufacturers and shoe styles often vary the width significantly. Make sure that you wear shoes that not only fit the length of your foot, but also the width. Also, make sure the shoes have a comfortable fit at the heel and the toes.    Stiff Sole  A still sole minimizes excessive loading through the forefront or midfoot. The opposite of this would be a highly flexible sole (ex. A slipper) which tends to concentrate the pressure in certain areas of the foot.    Rocker Bottom Contour  A smooth, slightly rounded contour to the sole of the shoe will help to even out force through the foot as it moves from heel strike to toe off.    Wide Toebox  In general, the front of the shoe should have enough space to accommodate the front of the foot without requiring it to be squished.    Soft, Comfortable Uppers  The material making up the upper part of the shoe, particularly that covering the toes, and midfoot should be soft and somewhat flexible enough to stretch (ex. soft leather).    Soft, Comfortable Insert  The bed that the foot rests on should be soft and accomodate to the foot. It may be necessary to buy a comfortable \"over-the-counter\" orthotic to replace the existing shoe insert.     Figure 3: Rocker Bottom  (slightly rounded contour of the sole)         HANDOUT COURTESY OF [employer]  The information contained in this handout is copyrighted by [employer]com . It may be reproduced in small quantities for  non-commercial educational purposes. This information is provided only for general education. It is not intended to provide specific  medical advice. It is expected that " individuals will consult a qualified licensed healthcare provider to provide information that is  relevant to their specific condition. Edited by Juan Miguel Shah MD, Lazaro Whitten MD, and Johnnie Pack MD       What are the stretching exercises?    Patients can typically perform many of the below stretches by themselves as part of a home exercise program  Occasionally a patient may need formal guidance from a physical therapist    Straight knee standing stretch  Stand facing a wall with your unaffected leg forward with a slight bend at the knee. Your affected leg behind you with the knee straight or extended.   The heels should be flat on the floor.   Press your hips forward toward the wall. Try not to arch your back.  You should feel a pulling or stretching sensation in the back of the leg along the calf.   Hold this stretch for 30 seconds and then relax for 30 seconds. This is one repetition.  You should perform 10 repetitions to make a set and you should perform 2-3 sets per day.   Because the knee is kept straight, this is a good stretch for the gastrocnemius muscle.   1      Bent knee standing stretch  Stand facing a wall with your unaffected leg forward with a slight bend at the knee. Your affected leg is behind you with the knee bent or flexed.  The heels should be flat on the floor  Press your hips forward toward the wall and try not to arch your back.  Hold this stretch for 30 seconds and then relax for 30 seconds. This is one repetition.  You should perform 10 repetitions to make a set and you should perform 2-3 sets per day.   Because the knee is bent, this is a good stretch for the soleus muscle.     2    Seated towel stretch  Sit on the floor or in bed with both legs out in front of you.  Loop a towel or a belt around the ball of your affected foot and grasp the ends of the towel in your hands.  Keep your affected knee straight or extended and pull the towel toward you.  Hold for 30 seconds and then  relax for 30 seconds. This is one repetition.  3  You should perform 10 repetitions to make a set and you should perform 2-3 sets per day.       Wall stretch  Stand about two feet away from a wall. Place the ball of your affected foot against the wall while the heel remains on the ground.   Slowly and gently lean into the wall with your hips while keeping your knee straight.  Hold this for about 30 seconds and then relax. This is one repetition.   You should perform 10 repetitions to make a set and you should perform 2-3 sets per day.    4    Downward dog yoga stretch  Get down on all fours with your hands positioned under your shoulders on the floor.  Walk your hands forward slightly on the floor.   Spread your fingers apart to allow for a broad base of support.   Push your hips up toward the ceiling and tighten your abdominal muscles.   Keep your heels on the ground and gently try to straighten your knees.  You should feel a pull or stretching sensation at the back of both legs along the calf muscles.  Hold this stretch for about 15-30 seconds and then relax. This is a repetition.   5  You should perform 5-10 repetitions to make a set and perform 2 sets per day.     Foam roller stretch  Sit on the floor with your legs stretched out in front of you.   Place the foam roller under the lower half of your affected leg.   Cross the other leg over the affected leg.   Push up with your arms so that your bottom is off the floor  Slowly roll yourself over the foam roller back and forth working your way up the calf muscle toward the knee. It is not necessary to roll across the back of the knee.   Try performing this with your toes pointing inward and outward to get a slightly different stretch.  Roll the affected side for about 30 seconds and then relax.   6    Heel drops  This option is a strengthening exercise in addition to a stretching exercise  Stand on the ball of your foot while positioned on the edge of a ledge, such  as a stair   Slowly lower the heels down below the ledge  The lowering of the heels should be controlled and deliberate and should take about 10 seconds. This is the strengthening portion of this exercise.   When the heels have dropped completely, this position can be held longer to get a good stretch.   Heel drops can be done with both legs at the same time or one leg at a time for a more concentrated effort.   This can also be done with the knees straight (stretching the gastrocnemius) or with the knees bent (to stretch the soleus).   7  Perform 10 repetitions of this exercise to make a set. Try to perform 2 sets per day.       RESOURCES  Some of the above material, including images, was adapted from the American Academy of [employer]opedic [employer]'s patient education resource called OrthoInfo. Additional information can be found at www.orthoinfo.org. Search “foot and ankle conditioning program”.   Some of the above material, including images, was adapted from http://www.stretching-exercises-guide.com/calf-stretches.html

## 2022-12-30 NOTE — PROGRESS NOTES
[facility] Orthopaedic Surgery Office Visit     Office Visit       Date: 12/30/2022   Patient Name: Lizabeth Rangel  MRN: [id number]  YOB: 1955    Referring Physician: No ref. provider found     Chief Complaint:   Chief Complaint   Patient presents with   • Left Foot - Pain, Initial Evaluation     Hx Bilateral Ankle ORIF 1994 (R) & 1998 (L)       History of Present Illness: Lizabeth Rangel is a 67 y.o. female who is here today with left forefoot pain.  Pain has been present for the last 2 to 3 weeks.  States saw previous podiatrist who she did not care for who trimmed the callus off of the plantar fifth MTP joint.  States that did help with her symptoms somewhat.  Has also tried various shoewear which is also help with her symptoms.  Pain worse with ambulation.    Subjective   Review of Systems: Review of Systems   Constitutional: Negative for chills, fever, unexpected weight gain and unexpected weight loss.   HENT: Negative for congestion, postnasal drip and rhinorrhea.    Eyes: Negative for blurred vision.   Respiratory: Negative for shortness of breath.    Cardiovascular: Negative for leg swelling.   Gastrointestinal: Negative for abdominal pain, nausea and vomiting.   Genitourinary: Negative for difficulty urinating.   Musculoskeletal: Positive for arthralgias. Negative for gait problem, joint swelling and myalgias.   Skin: Negative for skin lesions and wound.   Neurological: Negative for dizziness, weakness, light-headedness and numbness.   Hematological: Does not bruise/bleed easily.   Psychiatric/Behavioral: Negative for depressed mood.        Past Medical History:   Past Medical History:   Diagnosis Date   • Allergic rhinitis    • Ankle fracture 1994 1994 (R) Ankle ORIF and 1998 (L) Ankle ORIF   • Breast injury 08/26/2020    fell 3 weeks ago   • Gallstone pancreatitis 1997   • Perforated bowel (HCC) 1997   • Sleep apnea        Past Surgical History:    Past Surgical History:   Procedure Laterality Date   • ANKLE OPEN REDUCTION INTERNAL FIXATION       (R) Ankle ORIF and  (L) Ankle ORIF   • CATARACT EXTRACTION     • CATARACT EXTRACTION     • CHOLECYSTECTOMY     • COLON RESECTION     • COLON RESECTION      bowel perforation repair   • ENDOSCOPY      Gastro issue       Family History:   Family History   Problem Relation Age of Onset   • Obesity Mother    • Alcohol abuse Father    • Obesity Father    • Breast cancer Maternal Aunt 30   • Ovarian cancer Neg Hx    • Endometrial cancer Neg Hx        Social History:   Social History     Socioeconomic History   • Marital status:    Tobacco Use   • Smoking status: Former     Packs/day: 2.00     Years: 40.00     Pack years: 80.00     Types: Cigarettes     Quit date: 1989     Years since quittin.5   • Smokeless tobacco: Never   Substance and Sexual Activity   • Alcohol use: No   • Drug use: No   • Sexual activity: Defer       Medications:   Current Outpatient Medications:   •  fexofenadine (ALLEGRA) 60 MG tablet, Take 60 mg by mouth Daily As Needed., Disp: , Rfl:   •  fluticasone (FLONASE) 50 MCG/ACT nasal spray, 2 sprays into the nostril(s) as directed by provider Daily As Needed for Allergies., Disp: , Rfl:   •  ketoconazole (NIZORAL) 2 % cream, APPLY TO RASH UNDER BREAST BID.  TAPER USE AS CONDITION IMPROVES   PRN, Disp: , Rfl: 2  •  ketoconazole (NIZORAL) 2 % shampoo, LATHER ON SCALP TWO TO THREE TIMES PER WEEK AND RINSE AFTER 5 MINUTES    PRN, Disp: , Rfl: 3  •  vitamin B-12 (CYANOCOBALAMIN) 1000 MCG tablet, Take 1 tablet by mouth Daily. (Patient taking differently: Take 1,000 mcg by mouth Daily. When remembered), Disp: 90 tablet, Rfl: 3    Allergies:   Allergies   Allergen Reactions   • Thimerosal Itching     Itchy red eyes        I reviewed the patient's chief complaint, history of present illness, review of systems, past medical history, surgical history, family history, social  "history, medications and allergy list.     Objective    Vital Signs:   Vitals:    12/30/22 0924   BP: 136/82   Weight: 88.8 kg (195 lb 12.8 oz)   Height: 152.5 cm (60.04\")     Body mass index is 38.19 kg/m².    Ortho Exam:  left LE Foot and Ankle Exam:   Normal gait pattern. Hindfoot alignment is neutral. Plantigrade foot.   Neurological exam of the superficial peroneal, deep peroneal, plantar, sural and saphenous nerves demonstrates intact sensation and normal motor function.   Peripheral pulses including posterior tibial artery and deep peroneal artery are intact and palpable. There is good perfusion to the toes.   The skin is intact throughout the foot and ankle without ulceration.   Range of motion of ankle, subtalar joint, midfoot and toes is within normal limits.   There is tenderness to palpation over plantar fifth MTP joint, palpable plantar callus over area.  Skin intact.    Results Review:   Imaging Results (Last 24 Hours)     Procedure Component Value Units Date/Time    XR Ankle 3+ View Bilateral [[id number]] Resulted: 12/30/22 1156     Updated: 12/30/22 1157    Narrative:      Bilateral Ankle X-Ray 12/30/22   Indication: Pain  Views: 3 weight bearing , comparison none  Findings: xrays reviewed by me today in the office and show, No fracture,   No bony lesion, Soft tissues normal, Normal joint spaces with mortise   well-aligned, no evidence of syndesmosis widening, degenerative changes   consistent with age     XR Foot 3+ View Left [[id number]] Resulted: 12/30/22 1144     Updated: 12/30/22 1146    Narrative:      Left Foot X-Ray 12/30/22   Indication: Pain  Views: 3 weight bearing , comparison none  Findings: xrays reviewed by me today in the office and show, No fracture,   No bony lesion, Normal soft tissues, Normal joint spaces, wide forefoot   with visible type II bunionette deformity            Assessment / Plan    Assessment/Plan:   Diagnoses and all orders for this visit:    1. S/P ORIF (open " reduction internal fixation) fracture (Primary)  -     Cancel: XR Ankle 2 View Bilateral  -     XR Ankle 3+ View Bilateral    2. Left foot pain  -     XR Foot 3+ View Left  -     Ambulatory Referral For Orthotics      Discussed acute foot pain/injury (undiagnosed new problem with uncertain prognosis) with patient as well as treatment options at length. Decision regarding surgical intervention considered. Patient is not a candidate due to trial of nonoperative management.  We will plan to treat this as pain secondary to bunionette deformity.  I explained the mechanical nature of the problem. I explained that bunionette's are a result of heredity and shoe wear. The reason they typically hurt is from shoes pushing on the bunionette.      I explained that first line treatment is conservative. Wide, round toed shoes can help them be comfortable.  Provided patient with information on proper shoe wear.    If conservative treatment does not help the pain enough then the patient might consider surgery. Pain is the only indication for surgery. I advised them that I never talk anyone into bunionette surgery, only the patient can decide when or if the pain is enough to undergo surgery.      Surgery does little to change the appearance of the foot and it does not change shoe size. The fifth toe is typically permanently somewhat stiffer after surgery. The surgery has a long recovery time, and all foot and ankle surgery swells longer than any other type of surgery.     Also provided patient with information on calf stretching exercises which I recommend patient to do several times a day to help offload forefoot.  Also provided patient with referral to orthotist for custom orthotics to be made to help offload plantar left fifth metatarsal head.  Patient to trial these nonoperative treat modalities and return to clinic as needed if symptoms persist or worsen.    Patient suffers from pain and foot/ankle instability.  I am ordering  bilateral custom molded foot orthoses to stabilize and reduce pain.  Custom required for tri-plane control, deformity, lifetime need.    Follow Up:   Return if symptoms worsen or fail to improve.      Camden Hendricks MD  [facility] Orthopedic Surgeon

## 2023-01-24 ENCOUNTER — HOSPITAL ENCOUNTER (OUTPATIENT)
Dept: BONE DENSITY | Facility: HOSPITAL | Age: 68
Discharge: HOME | End: 2023-01-24
Payer: COMMERCIAL

## 2023-01-24 ENCOUNTER — APPOINTMENT (OUTPATIENT)
Dept: OTHER | Facility: HOSPITAL | Age: 68
End: 2023-01-24
Payer: COMMERCIAL

## 2023-01-24 ENCOUNTER — HOSPITAL ENCOUNTER (OUTPATIENT)
Dept: MAMMOGRAPHY | Facility: HOSPITAL | Age: 68
Discharge: HOME | End: 2023-01-24
Payer: COMMERCIAL

## 2023-01-24 DIAGNOSIS — Z12.31 ENCOUNTER FOR SCREENING MAMMOGRAM FOR MALIGNANT NEOPLASM OF BREAST: ICD-10-CM

## 2023-01-24 DIAGNOSIS — Z78.0 POSTMENOPAUSE: ICD-10-CM

## 2023-01-24 PROCEDURE — 77063 BREAST TOMOSYNTHESIS BI: CPT

## 2023-01-24 PROCEDURE — 77067 SCR MAMMO BI INCL CAD: CPT

## 2023-01-24 PROCEDURE — 77063 BREAST TOMOSYNTHESIS BI: CPT | Performed by: RADIOLOGY

## 2023-01-24 PROCEDURE — 77067 SCR MAMMO BI INCL CAD: CPT | Performed by: RADIOLOGY

## 2023-01-24 PROCEDURE — 77080 DXA BONE DENSITY AXIAL: CPT

## 2023-02-13 ENCOUNTER — OFFICE VISIT (OUTPATIENT)
Dept: INTERNAL MEDICINE | Facility: CLINIC | Age: 68
End: 2023-02-13
Payer: COMMERCIAL

## 2023-02-13 VITALS
DIASTOLIC BLOOD PRESSURE: 68 MMHG | SYSTOLIC BLOOD PRESSURE: 118 MMHG | TEMPERATURE: 98.2 F | WEIGHT: 202 LBS | HEIGHT: 60 IN | BODY MASS INDEX: 39.66 KG/M2 | HEART RATE: 64 BPM

## 2023-02-13 DIAGNOSIS — E78.2 MIXED HYPERLIPIDEMIA: Primary | ICD-10-CM

## 2023-02-13 DIAGNOSIS — R73.9 HYPERGLYCEMIA: ICD-10-CM

## 2023-02-13 DIAGNOSIS — E53.8 B12 DEFICIENCY: ICD-10-CM

## 2023-02-13 PROCEDURE — 90471 IMMUNIZATION ADMIN: CPT | Performed by: NURSE PRACTITIONER

## 2023-02-13 PROCEDURE — 90750 HZV VACC RECOMBINANT IM: CPT | Performed by: NURSE PRACTITIONER

## 2023-02-13 PROCEDURE — 99214 OFFICE O/P EST MOD 30 MIN: CPT | Performed by: NURSE PRACTITIONER

## 2023-02-13 RX ORDER — CHOLECALCIFEROL (VITAMIN D3) 125 MCG
1 CAPSULE ORAL DAILY
Qty: 90 TABLET | Refills: 3 | Status: SHIPPED | OUTPATIENT
Start: 2023-02-13

## 2023-02-13 RX ORDER — LANOLIN ALCOHOL/MO/W.PET/CERES
1000 CREAM (GRAM) TOPICAL DAILY
Qty: 90 TABLET | Refills: 3 | Status: SHIPPED | OUTPATIENT
Start: 2023-02-13

## 2023-02-13 RX ORDER — ERGOCALCIFEROL 1.25 MG/1
50000 CAPSULE ORAL WEEKLY
COMMUNITY
End: 2023-03-24

## 2023-02-13 NOTE — PROGRESS NOTES
Follow Up Office Visit      Patient Name: Lizabeth Rangel  : 1955   MRN: [id number]   Care Team: Patient Care Team:  Holli Kaur APRN as PCP - General (Internal Medicine)    Chief Complaint:    Chief Complaint   Patient presents with   • Hyperlipidemia     3 mo. F/u    • shringRix     2 nd vaccine       History of Present Illness: Lizabeth Rangel is a 67 y.o. female with pertinent medical history significant for hyperlipidemia, vitamin D deficiency, B12 deficiency, osteopenia, GERD, emphysema, pulmonary hypertension.  She presents today for a 3-month follow up.       GERD  She reports she is doing well. She reports her acid reflux symptoms have improved. She states she experiences a burning sensation when she drinks coffee without food.     Seasonal allergies  She states her allergies are good overall.     She reports she has obtained a mammogram and bone density test.     Hyperlipidemia   Notes she is starting a plant based diet. She reports she has changed her diet. She notes she has tried to stop eating meat and minimal amounts of cheese.    She is inquiring about receiving her second dose of Shingrix vaccine today.    Previously with low vitamin D and status post 6-week therapy of ergocalciferol 50,000 weekly.  Patient admits she has not been using over-the-counter vitamin D3.  Subjective        I have reviewed and the following portions of the patient's history were updated as appropriate: past family history, past medical history, past social history, past surgical history and problem list.    Medications:     Current Outpatient Medications:   •  fexofenadine (ALLEGRA) 60 MG tablet, Take 60 mg by mouth Daily As Needed., Disp: , Rfl:   •  fluticasone (FLONASE) 50 MCG/ACT nasal spray, 2 sprays into the nostril(s) as directed by provider Daily As Needed for Allergies., Disp: , Rfl:   •  vitamin B-12 (CYANOCOBALAMIN) 1000 MCG tablet, Take 1 tablet by mouth Daily., Disp: 90 tablet, Rfl:  "3  •  Cholecalciferol (Vitamin D3) 50 MCG (2000 UT) tablet, Take 1 tablet by mouth Daily., Disp: 90 tablet, Rfl: 3  •  vitamin D (ERGOCALCIFEROL) 1.25 MG (55085 UT) capsule capsule, Take 50,000 Units by mouth 1 (One) Time Per Week. Ran out, Disp: , Rfl:     Allergies:   Allergies   Allergen Reactions   • Thimerosal Itching     Itchy red eyes        Objective     Physical Exam:  Vital Signs:   Vitals:    02/13/23 0811   BP: 118/68   BP Location: Left arm   Patient Position: Sitting   Cuff Size: Large Adult   Pulse: 64   Temp: 98.2 °F (36.8 °C)   TempSrc: Temporal   Weight: 91.6 kg (202 lb)   Height: 152.5 cm (60.04\")   PainSc: 0-No pain     Body mass index is 39.4 kg/m².     Physical Exam  Vitals and nursing note reviewed.   Constitutional:       General: She is not in acute distress.  HENT:      Head: Normocephalic and atraumatic.      Comments: Patient wearing facial mask.     Right Ear: Tympanic membrane, ear canal and external ear normal.      Left Ear: Tympanic membrane, ear canal and external ear normal.   Eyes:      General: No scleral icterus.     Conjunctiva/sclera: Conjunctivae normal.   Cardiovascular:      Rate and Rhythm: Normal rate and regular rhythm.   Abdominal:      General: Bowel sounds are normal.      Tenderness: There is no abdominal tenderness.   Musculoskeletal:      Cervical back: Neck supple. No tenderness.      Right lower leg: No edema.      Left lower leg: No edema.   Lymphadenopathy:      Cervical: No cervical adenopathy.   Neurological:      Mental Status: She is alert. Mental status is at baseline.      Gait: Gait normal.   Psychiatric:         Mood and Affect: Mood normal.         Thought Content: Thought content normal.         Judgment: Judgment normal.         Assessment / Plan      Assessment/Plan:   Problems Addressed This Visit    ICD-10-CM ICD-9-CM   1. Mixed hyperlipidemia  E78.2 272.2   2. B12 deficiency  E53.8 266.2   3. Hyperglycemia  R73.9 790.29    "   Hyperlipidemia  -Encourage patient to proceed with plant-based diet, ongoing dietary modifications to lower cholesterol  -Lipid panel ordered      B12 deficiency  -Continue cyanocobalamin 1000 mcg daily, refill sent to pharmacy    Hyperglycemia  -Noted previous elevated glucose readings and hemoglobin A1c  -Recheck hemoglobin A1c    Vitamin D deficiency   -Noted that patient has not been on over-the-counter therapy of vitamin D3  -Ergocalciferol 50,000 units weekly x6 weeks    Shingrix vaccine administered today and she is now complete on her series      Discussed previous DEXA scan results from 11/14/2022-noted osteopenia  -Recommendations for daily walking, light weightbearing exercises 2-3 times weekly and continuing vitamin D therapy  -Plan to repeat bone density scan in approximately 2 to 3 years    Plan of care reviewed with patient at the conclusion of today's visit. Education was provided regarding diagnosis and management.  Patient verbalizes understanding of and agreement with management plan.      Follow Up:   Return in about 6 months (around 8/13/2023).        VALDEZ Gordon  [facility] [address] [facility]    Please note that portions of this note were completed with a voice recognition program.    Transcribed from ambient dictation for VALDEZ Fontanez by Kraig Rizzo.  02/13/23   10:24 EST    Patient or patient representative verbalized consent to the visit recording.  I have personally performed the services described in this document as transcribed by the above individual, and it is both accurate and complete.

## 2023-03-03 ENCOUNTER — OFFICE VISIT (OUTPATIENT)
Dept: PULMONOLOGY | Facility: CLINIC | Age: 68
End: 2023-03-03
Payer: COMMERCIAL

## 2023-03-03 VITALS
HEART RATE: 69 BPM | OXYGEN SATURATION: 97 % | HEIGHT: 60 IN | WEIGHT: 203.13 LBS | BODY MASS INDEX: 39.88 KG/M2 | TEMPERATURE: 97.3 F | DIASTOLIC BLOOD PRESSURE: 100 MMHG | RESPIRATION RATE: 16 BRPM | SYSTOLIC BLOOD PRESSURE: 144 MMHG

## 2023-03-03 DIAGNOSIS — J43.2 CENTRILOBULAR EMPHYSEMA: Primary | ICD-10-CM

## 2023-03-03 DIAGNOSIS — G47.33 OSA (OBSTRUCTIVE SLEEP APNEA): ICD-10-CM

## 2023-03-03 DIAGNOSIS — R91.1 SOLITARY PULMONARY NODULE: ICD-10-CM

## 2023-03-03 PROCEDURE — 94375 RESPIRATORY FLOW VOLUME LOOP: CPT | Performed by: INTERNAL MEDICINE

## 2023-03-03 PROCEDURE — 94729 DIFFUSING CAPACITY: CPT | Performed by: INTERNAL MEDICINE

## 2023-03-03 PROCEDURE — 94726 PLETHYSMOGRAPHY LUNG VOLUMES: CPT | Performed by: INTERNAL MEDICINE

## 2023-03-03 PROCEDURE — 99214 OFFICE O/P EST MOD 30 MIN: CPT | Performed by: INTERNAL MEDICINE

## 2023-03-03 NOTE — PROGRESS NOTES
Subjective:     Chief Complaint:   Chief Complaint   Patient presents with   • Centrilobular Emphysema     F/u   • Pulmonary Nodule       HPI:    Lizabeth Rangel is a 67 y.o. female here for follow-up of pulmonary nodule and COPD    I saw her initially in 2018 for consultation regarding a pulmonary nodule.  She had undergone a low-dose screening CAT scan at [facility].  A 6 mm nodule was found in the lingula.  This was described as groundglass.  Mild centrilobular emphysematous changes were noted.  She had a remote history of smoking having quit in 1989.     She had a follow-up scan in June 2019.  This indicated complete stability of the groundglass nodule with a description of some postinflammatory/fibrotic changes in the lingula.     She had a follow-up scan at [facility] on June 16 2020.  This revealed complete stability of the groundglass changes in the lingula.    A follow-up scan was recommended in 1 year in June 2021 but she was temporarily lost to follow-up and now returns for a subsequent visit.  Her last scan was in June 2020 as described.    She notes no new pulmonary symptoms.  She has always tolerated CPAP therapy poorly and discontinued this entirely.  She notes no significant daytime somnolence and is not interested in reinstituting therapy.    She is not on any inhaled therapy at this point.    Current medications are:   Current Outpatient Medications:   •  Cholecalciferol (Vitamin D3) 50 MCG (2000 UT) tablet, Take 1 tablet by mouth Daily., Disp: 90 tablet, Rfl: 3  •  fexofenadine (ALLEGRA) 60 MG tablet, Take 1 tablet by mouth Daily As Needed., Disp: , Rfl:   •  fluticasone (FLONASE) 50 MCG/ACT nasal spray, 2 sprays into the nostril(s) as directed by provider Daily As Needed for Allergies., Disp: , Rfl:   •  vitamin B-12 (CYANOCOBALAMIN) 1000 MCG tablet, Take 1 tablet by mouth Daily., Disp: 90 tablet, Rfl: 3  •  vitamin D (ERGOCALCIFEROL) 1.25 MG (45373 UT)  capsule capsule, Take 1 capsule by mouth 1 (One) Time Per Week. Ran out, Disp: , Rfl: .      The patient's relevant past medical, surgical, family and social history were reviewed and updated in Epic as appropriate.     ROS:    Review of Systems  ROS as documented in patient questionnaire unless as noted otherwise    Objective:    Physical Exam  Vitals reviewed.   Constitutional:       Appearance: She is well-developed.   HENT:      Head: Normocephalic and atraumatic.      Mouth/Throat:      Mouth: Mucous membranes are moist.      Pharynx: Oropharynx is clear.   Neck:      Thyroid: No thyromegaly.   Cardiovascular:      Rate and Rhythm: Normal rate and regular rhythm.      Heart sounds: No murmur heard.    No friction rub. No gallop.   Pulmonary:      Effort: Pulmonary effort is normal. No respiratory distress.      Breath sounds: No wheezing or rales.   Musculoskeletal:      Cervical back: Neck supple.   Skin:     General: Skin is warm and dry.   Neurological:      Mental Status: She is alert and oriented to person, place, and time.   Psychiatric:         Behavior: Behavior normal.         Thought Content: Thought content normal.         Data:    CXR: No significant change over priors.  The nodule is not visible.    PFT: Mild obstruction.  Increased residual volume as before.  Normal diffusion.  No significant change over priors.    Assessment:    Problem List Items Addressed This Visit        Pulmonary Problems    CAREN (obstructive sleep apnea)    Overview     CPAP intolerant         Solitary pulmonary nodule    Overview     1. 6 mm GG lingula 6/2018  2. No enlargement 6/2019  3. Stable 6/2020         Relevant Orders    CT Chest Without Contrast Diagnostic    Centrilobular emphysema (HCC) - Primary    Relevant Orders    XR Chest PA & Lateral         1. Solitary pulmonary nodule: 6 mm groundglass nodule in lingula first discovered May 2018 and has been stable on 2 CT scans since then but has been lost to follow-up  since June 2020.  2. CAREN: CPAP intolerant.  No daytime sleepiness.  3. Centrilobular emphysema: Mild airway obstruction.  Air-trapping.  Stable PFTs.  On no inhaled therapy.    Plan:    1. One final follow-up CAT scan.  This would confirm stability for 5 years and no further follow-up would be needed, even for a groundglass nodule.  2. In regards to her other issues she can follow-up with her primary care provider.  3. She was comfortable with no further follow-up as long as the CAT scan was stable.    Level of service justified based on 32 minutes spent in patient care on this date of service including, but not limited to: preparing to see the patient, obtaining and/or reviewing history, performing medically appropriate examination, ordering tests/medicine/procedures, independently interpreting results, documenting clinical information in EHR, and counseling/education of patient/family/caregiver.  This is exclusive of time spent on other separate services such as performing procedures or test interpretation, if applicable.  (Level 4 30-39 minutes; Level 5 40-54 minutes)    Discussed in detail with the patient.      Signed by  Gary Hamilton MD

## 2023-03-21 DIAGNOSIS — Z00.6 EXAMINATION FOR NORMAL COMPARISON OR CONTROL IN CLINICAL RESEARCH: Primary | ICD-10-CM

## 2023-03-21 DIAGNOSIS — R91.1 SOLITARY PULMONARY NODULE: ICD-10-CM

## 2023-03-24 ENCOUNTER — OFFICE VISIT (OUTPATIENT)
Dept: INTERNAL MEDICINE | Facility: CLINIC | Age: 68
End: 2023-03-24
Payer: COMMERCIAL

## 2023-03-24 VITALS
OXYGEN SATURATION: 96 % | HEIGHT: 60 IN | BODY MASS INDEX: 39.46 KG/M2 | SYSTOLIC BLOOD PRESSURE: 120 MMHG | WEIGHT: 201 LBS | TEMPERATURE: 98.4 F | HEART RATE: 79 BPM | DIASTOLIC BLOOD PRESSURE: 70 MMHG

## 2023-03-24 DIAGNOSIS — R22.1 NECK SWELLING: Primary | ICD-10-CM

## 2023-03-24 PROCEDURE — 99214 OFFICE O/P EST MOD 30 MIN: CPT | Performed by: PHYSICIAN ASSISTANT

## 2023-03-24 RX ORDER — MELATONIN
1000 DAILY
COMMUNITY

## 2023-03-24 NOTE — PROGRESS NOTES
[facility] Medicine    Lizabeth Rangel  1955   [id number]      Patient Care Team:  Holli Kaur APRN as PCP - General (Internal Medicine)  Gary Hamilton MD as Consulting Physician (Pulmonary Disease)    Chief Complaint::   Chief Complaint   Patient presents with   • swollen around clavical         HPI  Shireen is a 67-year-old female date of birth 1955 who presents today for evaluation of swelling around her clavicle. She noticed last night that there was a swelling at the edge of her clavicle, L>R.  She reports that it is not painful. She denies knowledge of any type of arthritis.   Fortunately, she had a CT scan of her chest last week.  She denies fever or body aches.  She does report needing a crown replaced on bottom of left side of mouth.      Patient Active Problem List   Diagnosis   • Allergic rhinitis due to pollen   • Dyspnea   • Ex-smoker   • CAREN (obstructive sleep apnea)   • Solitary pulmonary nodule   • Centrilobular emphysema (HCC)   • Post-menopausal   • Vitamin D deficiency   • B12 deficiency   • Mixed hyperlipidemia   • Osteopenia   • Pulmonary hypertension (HCC)   • Spondylosis of thoracic region without myelopathy or radiculopathy   • Postural kyphosis of thoracic region   • Aortic atherosclerosis (HCC)   • Closed nondisplaced fracture of head of right radius   • Elbow stiffness, right   • Left foot pain   • Neck swelling        Past Medical History:   Diagnosis Date   • Allergic rhinitis    • Ankle fracture 1994 1994 (R) Ankle ORIF and 1998 (L) Ankle ORIF   • Breast injury 08/26/2020    fell 3 weeks ago   • Gallstone pancreatitis 1997   • Perforated bowel (HCC) 1997   • Sleep apnea        Past Surgical History:   Procedure Laterality Date   • ANKLE OPEN REDUCTION INTERNAL FIXATION      1994 (R) Ankle ORIF and 1998 (L) Ankle ORIF   • CATARACT EXTRACTION  2018   • CATARACT EXTRACTION  2017   • CHOLECYSTECTOMY  1997   • COLON RESECTION     • COLON RESECTION   "    bowel perforation repair   • ENDOSCOPY      Gastro issue       Family History   Problem Relation Age of Onset   • Obesity Mother    • Alcohol abuse Father    • Obesity Father    • Breast cancer Maternal Aunt 30   • Ovarian cancer Neg Hx    • Endometrial cancer Neg Hx        Social History     Socioeconomic History   • Marital status:    Tobacco Use   • Smoking status: Former     Packs/day: 2.00     Years: 40.00     Pack years: 80.00     Types: Cigarettes     Quit date: 1989     Years since quittin.7   • Smokeless tobacco: Never   Substance and Sexual Activity   • Alcohol use: No   • Drug use: No   • Sexual activity: Defer       Allergies   Allergen Reactions   • Thimerosal Itching     Itchy red eyes        Review of Systems   Constitutional: Negative for fatigue and fever.   Musculoskeletal: Negative for neck pain and neck stiffness.        Thickening at base of neck   Skin: Negative for color change, dry skin, pallor and wound.   Neurological: Negative for weakness.        Vital Signs  Vitals:    23 1157   BP: 120/70   BP Location: Left arm   Patient Position: Sitting   Cuff Size: Large Adult   Pulse: 79   Temp: 98.4 °F (36.9 °C)   TempSrc: Temporal   SpO2: 96%   Weight: 91.2 kg (201 lb)   Height: 152.5 cm (60.04\")   PainSc: 0-No pain     Body mass index is 39.2 kg/m².  Class 2 Severe Obesity (BMI >=35 and <=39.9). Obesity-related health conditions include the following: none. Obesity is unchanged. BMI is is above average; BMI management plan is completed. We discussed low calorie, low carb based diet program, portion control and increasing exercise.     Advance Care Planning   ACP discussion was held with the patient during this visit. Patient does not have an advance directive, information provided.       Current Outpatient Medications:   •  Cholecalciferol (Vitamin D3) 50 MCG (2000 UT) tablet, Take 1 tablet by mouth Daily., Disp: 90 tablet, Rfl: 3  •  Cholecalciferol 25 MCG (1000 " UT) tablet, Take 1 tablet by mouth Daily., Disp: , Rfl:   •  fexofenadine (ALLEGRA) 60 MG tablet, Take 1 tablet by mouth Daily As Needed., Disp: , Rfl:   •  fluticasone (FLONASE) 50 MCG/ACT nasal spray, 2 sprays into the nostril(s) as directed by provider Daily As Needed for Allergies., Disp: , Rfl:   •  vitamin B-12 (CYANOCOBALAMIN) 1000 MCG tablet, Take 1 tablet by mouth Daily., Disp: 90 tablet, Rfl: 3    Physical Exam  Vitals and nursing note reviewed.   Constitutional:       Appearance: Normal appearance.   HENT:      Head: Normocephalic and atraumatic.      Right Ear: Tympanic membrane, ear canal and external ear normal.      Left Ear: Tympanic membrane, ear canal and external ear normal.      Nose: Nose normal.      Mouth/Throat:      Mouth: Mucous membranes are moist.      Pharynx: Oropharynx is clear.   Eyes:      Pupils: Pupils are equal, round, and reactive to light.   Neck:     Cardiovascular:      Rate and Rhythm: Normal rate and regular rhythm.      Pulses: Normal pulses.      Heart sounds: Normal heart sounds.   Pulmonary:      Effort: Pulmonary effort is normal.   Musculoskeletal:      Cervical back: Normal range of motion. Edema present. No erythema or crepitus. No pain with movement. Normal range of motion.      Right lower leg: No edema.      Left lower leg: No edema.   Skin:     General: Skin is warm and dry.   Neurological:      General: No focal deficit present.      Mental Status: She is alert and oriented to person, place, and time.   Psychiatric:         Mood and Affect: Mood normal.         Thought Content: Thought content normal.         Judgment: Judgment normal.          ACE III MINI            Results Review:    No results found for this or any previous visit (from the past 672 hour(s)).  Procedures    Medication Review: Medications reviewed and noted    Social History     Socioeconomic History   • Marital status:    Tobacco Use   • Smoking status: Former     Packs/day: 2.00      Years: 40.00     Pack years: 80.00     Types: Cigarettes     Quit date: 1989     Years since quittin.7   • Smokeless tobacco: Never   Substance and Sexual Activity   • Alcohol use: No   • Drug use: No   • Sexual activity: Defer        Assessment/Plan:    Diagnoses and all orders for this visit:    1. Neck swelling (Primary)  Overview:  Fortunately, patient did have a CT scan of her chest last week which did show both clavicles.  No abnormalities noted on the scan.  We will check thyroid panel, sed rate, CBC today.  Dispo accordingly.      Orders:  -     TSH; Future  -     T4, Free; Future  -     T3, Free; Future  -     CBC & Differential; Future  -     Sedimentation Rate; Future         Plan of care reviewed with patient at the conclusion of today's visit. Education was provided regarding diagnosis, management, and any prescribed or recommended OTC medications.Patient verbalizes understanding of and agreement with management plan.         I spent 23 minutes caring for Lizabeth on this date of service. This time includes time spent by me in the following activities:preparing for the visit, reviewing tests, obtaining and/or reviewing a separately obtained history, performing a medically appropriate examination and/or evaluation , counseling and educating the patient/family/caregiver, ordering medications, tests, or procedures, referring and communicating with other health care professionals  and documenting information in the medical record    Carolee Jackson PA-C      Note: Part of this note may be an electronic transcription/translation of spoken language to printed text using the Dragon Dictation system.

## 2023-03-31 ENCOUNTER — OFFICE VISIT (OUTPATIENT)
Dept: ORTHOPEDIC SURGERY | Facility: CLINIC | Age: 68
End: 2023-03-31
Payer: COMMERCIAL

## 2023-03-31 VITALS
DIASTOLIC BLOOD PRESSURE: 78 MMHG | HEIGHT: 60 IN | WEIGHT: 201 LBS | BODY MASS INDEX: 39.46 KG/M2 | SYSTOLIC BLOOD PRESSURE: 132 MMHG

## 2023-03-31 DIAGNOSIS — S89.91XA INJURY OF RIGHT KNEE, INITIAL ENCOUNTER: Primary | ICD-10-CM

## 2023-03-31 DIAGNOSIS — M25.561 RIGHT KNEE PAIN, UNSPECIFIED CHRONICITY: ICD-10-CM

## 2023-03-31 DIAGNOSIS — M17.11 PRIMARY OSTEOARTHRITIS OF RIGHT KNEE: ICD-10-CM

## 2023-03-31 RX ORDER — IBUPROFEN 600 MG/1
600 TABLET ORAL EVERY 8 HOURS PRN
Qty: 90 TABLET | Refills: 1 | Status: SHIPPED | OUTPATIENT
Start: 2023-03-31

## 2023-03-31 NOTE — PROGRESS NOTES
[facility] [place] [facility] Surgery Office Follow Up       Office Follow Up Visit       Patient Name: Lizabeth Rangel    Chief Complaint:   Chief Complaint   Patient presents with   • Right Knee - Pain       Referring Physician: No ref. provider found    History of Present Illness:   Lizabeth Rangel returns to clinic today for right knee pain.  She reports she twisted her right knee on Wednesday while walking her dog.  She says she felt a tremendous amount of pain at the time.  The pain has slowly improved.  She is not having much pain this morning.  She has taken ibuprofen 600 mg a couple times when painful.  She did not have any knee issues prior to the injury.      Subjective     Review of Systems   Constitutional: Negative.  Negative for chills, fatigue and fever.   HENT: Negative.  Negative for congestion and dental problem.    Eyes: Negative.  Negative for blurred vision.   Respiratory: Negative.  Negative for shortness of breath.    Cardiovascular: Negative.  Negative for leg swelling.   Gastrointestinal: Negative.  Negative for abdominal pain.   Endocrine: Negative.  Negative for polyuria.   Genitourinary: Negative.  Negative for difficulty urinating.   Musculoskeletal: Positive for arthralgias.   Skin: Negative.    Allergic/Immunologic: Negative.    Neurological: Negative.    Hematological: Negative.  Negative for adenopathy.   Psychiatric/Behavioral: Negative.  Negative for behavioral problems.        I have reviewed and updated the following portions of the patient's history and review of systems: allergies, current medications, past family history, past medical history, past social history, past surgical history and problem list.    Medications:   Current Outpatient Medications:   •  cholecalciferol (VITAMIN D3) 25 MCG (1000 UT) tablet, Take 1 tablet by mouth Daily., Disp: , Rfl:   •  Cholecalciferol (Vitamin D3) 50 MCG (2000 UT) tablet, Take 1  "tablet by mouth Daily., Disp: 90 tablet, Rfl: 3  •  fexofenadine (ALLEGRA) 60 MG tablet, Take 1 tablet by mouth Daily As Needed., Disp: , Rfl:   •  fluticasone (FLONASE) 50 MCG/ACT nasal spray, 2 sprays into the nostril(s) as directed by provider Daily As Needed for Allergies., Disp: , Rfl:   •  vitamin B-12 (CYANOCOBALAMIN) 1000 MCG tablet, Take 1 tablet by mouth Daily., Disp: 90 tablet, Rfl: 3  •  ibuprofen (ADVIL,MOTRIN) 600 MG tablet, Take 1 tablet by mouth Every 8 (Eight) Hours As Needed for Mild Pain., Disp: 90 tablet, Rfl: 1    Allergies:   Allergies   Allergen Reactions   • Thimerosal Itching     Itchy red eyes          Objective      Vital Signs:   Vitals:    03/31/23 0843   BP: 132/78   Weight: 91.2 kg (201 lb)   Height: 152.5 cm (60.04\")       Ortho Exam:  Knee Exam: RIGHT  ----------  ALIGNMENT: neutral, no varus or valgus deformity     RANGE OF MOTION:  Normal (0-120 degrees) with no extensor lag or flexion contracture  LIGAMENTOUS STABILITY:   stable to varus and valgus stress at terminal extension and 30 degrees without any evidence of laxity     STRENGTH:  5/5 knee flexion, extension. 5/5 ankle dorsiflexion and plantarflexion.     PAIN WITH PALPATION: Mildly tender to palpation medial joint line  KNEE EFFUSION: no  PAIN WITH KNEE ROM: no    STRAIGHT LEG TEST:   negative    SENSATION TO LIGHT TOUCH:  DEEP PERONEAL/SUPERFICIAL PERONEAL/SURAL/SAPHENOUS/TIBIAL:   intact    EDEMA:  no  ERYTHEMA:  no  WOUNDS/INCISIONS: no overlying skin problems.      Results Review:  XR Knee 4+ View Right  Imaging: knee x-rays 4 views    Side: Right knee    Indication for x-rays: knee pain    Comparison: None    Findings:   Right knee no obvious acute bony findings.  Small ossicle noted   superficially.  No obvious effusion is noted.  Perhaps mild medial joint   space narrowing.    I personally reviewed the above x-rays.       Assessment / Plan      Assessment:   Diagnoses and all orders for this visit:    1. Injury of " right knee, initial encounter (Primary)    2. Primary osteoarthritis of right knee    3. Right knee pain, unspecified chronicity  -     XR Knee 4+ View Right    Other orders  -     ibuprofen (ADVIL,MOTRIN) 600 MG tablet; Take 1 tablet by mouth Every 8 (Eight) Hours As Needed for Mild Pain.  Dispense: 90 tablet; Refill: 1          Plan:  1. Reviewed x-ray findings with patient.  Shows evidence of mild primary osteoarthritis with some joint space narrowing.  No acute findings.  2.  May have had injury to medial meniscus.  Mildly tender to palpation medial joint line.  Given the pain has improved over the last 24 hours, we will hold on MRI.  3.  Prescription sent for 600 mg ibuprofen to take as needed.  4.  Recommend elevation and ice as needed.  5.  Given knee exercise handout for quad strengthening and stability to work at home.    Follow Up:   As needed    History, diagnosis and treatment plan discussed with Dr. Lind.      Sara Lawton PA-C  [facility] [place] [facility]    Dictated using Dragon Speech Recognition.

## 2023-06-01 ENCOUNTER — OFFICE VISIT (OUTPATIENT)
Dept: ORTHOPEDIC SURGERY | Facility: CLINIC | Age: 68
End: 2023-06-01
Payer: COMMERCIAL

## 2023-06-01 VITALS
HEIGHT: 61 IN | SYSTOLIC BLOOD PRESSURE: 150 MMHG | BODY MASS INDEX: 37 KG/M2 | DIASTOLIC BLOOD PRESSURE: 88 MMHG | WEIGHT: 196 LBS

## 2023-06-01 DIAGNOSIS — M25.511 RIGHT SHOULDER PAIN, UNSPECIFIED CHRONICITY: ICD-10-CM

## 2023-06-01 DIAGNOSIS — S42.254A CLOSED NONDISPLACED FRACTURE OF GREATER TUBEROSITY OF RIGHT HUMERUS, INITIAL ENCOUNTER: Primary | ICD-10-CM

## 2023-06-01 RX ORDER — METHOCARBAMOL 500 MG/1
TABLET, FILM COATED ORAL
COMMUNITY
Start: 2023-05-27

## 2023-06-01 NOTE — PROGRESS NOTES
[facility] [place] Orthopaedic Surgery Office Visit - Sara Lawton PA-C    Office Visit       Patient Name: Lizabeth Rangel    Chief Complaint:   Chief Complaint   Patient presents with    Right Shoulder - Pain       Referring Physician: No ref. provider found    History of Present Illness:   Lizabeth Rangel is a 67 y.o. female who presents with right shoulder pain.  She reports she fell in her kitchen 1 week ago on 5/26/2023 and landed on her right shoulder. She called EMS and they transported her to  ED. She was told she had a dislocation of her right shoulder.  She was reduced in the ED.  Encouraged to wear sling and discharged home.    This is her first dislocation event of the shoulder.    She says pain has improved since day of injury.  Rates current pain a 6/10.  She has been compliant with sling use.  Taking anti-inflammatories as needed for the pain.      Subjective     Review of Systems   Constitutional: Negative.  Negative for chills, fatigue and fever.   HENT: Negative.  Negative for congestion and dental problem.    Eyes: Negative.  Negative for blurred vision.   Respiratory: Negative.  Negative for shortness of breath.    Cardiovascular: Negative.  Negative for leg swelling.   Gastrointestinal: Negative.  Negative for abdominal pain.   Endocrine: Negative.  Negative for polyuria.   Genitourinary: Negative.  Negative for difficulty urinating.   Musculoskeletal:  Positive for arthralgias.   Skin: Negative.    Allergic/Immunologic: Negative.    Neurological: Negative.    Hematological: Negative.  Negative for adenopathy.   Psychiatric/Behavioral: Negative.  Negative for behavioral problems.       Past Medical History:   Past Medical History:   Diagnosis Date    Allergic rhinitis     Ankle fracture 1994 1994 (R) Ankle ORIF and 1998 (L) Ankle ORIF    Breast injury 08/26/2020    fell 3 weeks ago    Gallstone pancreatitis 1997    Perforated  bowel     Sleep apnea        Past Surgical History:   Past Surgical History:   Procedure Laterality Date    ANKLE OPEN REDUCTION INTERNAL FIXATION       (R) Ankle ORIF and  (L) Ankle ORIF    CATARACT EXTRACTION  2018    CATARACT EXTRACTION  2017    CHOLECYSTECTOMY      COLON RESECTION      COLON RESECTION      bowel perforation repair    ENDOSCOPY      Gastro issue       Family History:   Family History   Problem Relation Age of Onset    Obesity Mother     Alcohol abuse Father     Obesity Father     Breast cancer Maternal Aunt 30    Ovarian cancer Neg Hx     Endometrial cancer Neg Hx        Social History:   Social History     Socioeconomic History    Marital status:    Tobacco Use    Smoking status: Former     Packs/day: 2.00     Years: 40.00     Pack years: 80.00     Types: Cigarettes     Quit date: 1989     Years since quittin.9    Smokeless tobacco: Never   Substance and Sexual Activity    Alcohol use: No    Drug use: No    Sexual activity: Defer       Medications:   Current Outpatient Medications:     cholecalciferol (VITAMIN D3) 25 MCG (1000 UT) tablet, Take 1 tablet by mouth Daily., Disp: , Rfl:     Cholecalciferol (Vitamin D3) 50 MCG (2000 UT) tablet, Take 1 tablet by mouth Daily., Disp: 90 tablet, Rfl: 3    fexofenadine (ALLEGRA) 60 MG tablet, Take 1 tablet by mouth Daily As Needed., Disp: , Rfl:     fluticasone (FLONASE) 50 MCG/ACT nasal spray, 2 sprays into the nostril(s) as directed by provider Daily As Needed for Allergies., Disp: , Rfl:     ibuprofen (ADVIL,MOTRIN) 600 MG tablet, Take 1 tablet by mouth Every 8 (Eight) Hours As Needed for Mild Pain., Disp: 90 tablet, Rfl: 1    methocarbamol (ROBAXIN) 500 MG tablet, , Disp: , Rfl:     vitamin B-12 (CYANOCOBALAMIN) 1000 MCG tablet, Take 1 tablet by mouth Daily., Disp: 90 tablet, Rfl: 3    Allergies:   Allergies   Allergen Reactions    Thimerosal Itching     Itchy red eyes        I have reviewed and updated the  "following portions of the patient's history and review of systems: allergies, current medications, past family history, past medical history, past social history, past surgical history and problem list.    Objective      Vital Signs:   Vitals:    06/01/23 0817   BP: 150/88   Weight: 88.9 kg (196 lb)   Height: 153.7 cm (60.5\")       Ortho Exam:  General: no acute distress, comfortable  Vitals reviewed in chart    Musculoskeletal Exam    SIDE: Right shoulder  Shoulder Exam:    Tenderness: Proximal humerus  Sling in place  Neurovascularly intact  Good radial pulse    Painful arc of motion: Yes  No evidence of septic joint  No skin changes        Results Review:   XR Shoulder 2+ View Right  Imaging: shoulder x-rays 3 views - AP, axillary, and scapular-Y x-ray   views    Side: RIGHT SHOULDER    Indication for shoulder x-ray 3 views: shoulder pain    Comparison: no comparison views available    Findings:   History of right shoulder fracture dislocation.  She has a comminuted   greater tuberosity fracture that maintains reasonable alignment.  No   obvious evidence of glenoid fracture noted on plain radiographs.    I personally reviewed the above x-rays reviewed with KELLY Burris.         Assessment / Plan      Assessment:  Diagnoses and all orders for this visit:    1. Closed nondisplaced fracture of greater tuberosity of right humerus, initial encounter (Primary)    2. Right shoulder pain, unspecified chronicity  -     XR Shoulder 2+ View Right        Plan:  X-ray films reviewed today with Dr. Lind.  Compared today's films to prior images from .  Shows nondisplaced fracture of greater tuberosity.  Shoulder is well located.  No obvious evidence of glenoid fracture from dislocation event.  She will remain in sling for healing of greater tuberosity fracture for the next 2 weeks.  May come out for showering.  Continue with anti-inflammatories as needed for the pain and swelling.      Follow Up:   2 weeks--2+ views " right shoulder next visit    History, diagnosis and treatment plan discussed with Dr. Lind.    Sara Lawton PA-C  [facility] [place] [facility]       Dictated using Dragon Speech Recognition.

## 2023-06-13 ENCOUNTER — OFFICE VISIT (OUTPATIENT)
Dept: ORTHOPEDIC SURGERY | Facility: CLINIC | Age: 68
End: 2023-06-13
Payer: COMMERCIAL

## 2023-06-13 VITALS
SYSTOLIC BLOOD PRESSURE: 136 MMHG | BODY MASS INDEX: 38.05 KG/M2 | WEIGHT: 193.8 LBS | HEIGHT: 60 IN | DIASTOLIC BLOOD PRESSURE: 80 MMHG

## 2023-06-13 DIAGNOSIS — M25.511 RIGHT SHOULDER PAIN, UNSPECIFIED CHRONICITY: ICD-10-CM

## 2023-06-13 DIAGNOSIS — S42.254D CLOSED NONDISPLACED FRACTURE OF GREATER TUBEROSITY OF RIGHT HUMERUS WITH ROUTINE HEALING, SUBSEQUENT ENCOUNTER: Primary | ICD-10-CM

## 2023-06-13 RX ORDER — IBUPROFEN 600 MG/1
600 TABLET ORAL EVERY 8 HOURS PRN
Qty: 90 TABLET | Refills: 1 | Status: SHIPPED | OUTPATIENT
Start: 2023-06-13

## 2023-06-13 NOTE — PROGRESS NOTES
[facility] Office Follow Up       Office Follow Up Visit       Patient Name: Lizabeth Rangel    Chief Complaint:   Chief Complaint   Patient presents with    Follow-up     2 week follow up -- Closed nondisplaced fracture of greater tuberosity of right humerus (DOI: 5/26/23)        Referring Physician: No ref. provider found    History of Present Illness:   Lizabeth Rangel returns to clinic today for follow-up of greater tuberosity fracture right humerus.  Date of injury 5/26/2023.  Last visit on 6/1/2023.  She reports mild improvements in pain since last visit.  Taking ibuprofen as needed.  She has been compliant with sling use.  She says bruising has improved.  She has been working on range of motion of right elbow, wrist and hand.    6/1/23  She reports she fell in her kitchen 1 week ago on 5/26/2023 and landed on her right shoulder. She called EMS and they transported her to  ED. She was told she had a dislocation of her right shoulder.  She was reduced in the ED.  Encouraged to wear sling and discharged home.     This is her first dislocation event of the shoulder.     She says pain has improved since day of injury.  Rates current pain a 6/10.  She has been compliant with sling use.  Taking anti-inflammatories as needed for the pain.      Subjective     Review of Systems   Constitutional: Negative.  Negative for chills, fatigue and fever.   HENT: Negative.  Negative for congestion and dental problem.    Eyes: Negative.  Negative for blurred vision.   Respiratory: Negative.  Negative for shortness of breath.    Cardiovascular: Negative.  Negative for leg swelling.   Gastrointestinal: Negative.  Negative for abdominal pain.   Endocrine: Negative.  Negative for polyuria.   Genitourinary: Negative.  Negative for difficulty urinating.   Musculoskeletal:  Positive for arthralgias.   Skin: Negative.    Allergic/Immunologic: Negative.   "  Neurological: Negative.    Hematological: Negative.  Negative for adenopathy.   Psychiatric/Behavioral: Negative.  Negative for behavioral problems.       I have reviewed and updated the following portions of the patient's history and review of systems: allergies, current medications, past family history, past medical history, past social history, past surgical history and problem list.    Medications:   Current Outpatient Medications:     Cholecalciferol (Vitamin D3) 50 MCG (2000 UT) tablet, Take 1 tablet by mouth Daily., Disp: 90 tablet, Rfl: 3    fexofenadine (ALLEGRA) 60 MG tablet, Take 1 tablet by mouth Daily As Needed., Disp: , Rfl:     fluticasone (FLONASE) 50 MCG/ACT nasal spray, 2 sprays into the nostril(s) as directed by provider Daily As Needed for Allergies., Disp: , Rfl:     ibuprofen (ADVIL,MOTRIN) 600 MG tablet, Take 1 tablet by mouth Every 8 (Eight) Hours As Needed for Mild Pain., Disp: 90 tablet, Rfl: 1    methocarbamol (ROBAXIN) 500 MG tablet, , Disp: , Rfl:     vitamin B-12 (CYANOCOBALAMIN) 1000 MCG tablet, Take 1 tablet by mouth Daily., Disp: 90 tablet, Rfl: 3    Allergies:   Allergies   Allergen Reactions    Thimerosal Itching     Itchy red eyes          Objective      Vital Signs:   Vitals:    06/13/23 0820   BP: 136/80   Weight: 87.9 kg (193 lb 12.8 oz)   Height: 153.5 cm (60.43\")       Ortho Exam:  General: no acute distress, comfortable  Vitals reviewed in chart    Musculoskeletal Exam:    SIDE: Right shoulder    Tenderness: Mild tenderness right humerus    Range of motion measurements (degrees): Normal range of motion hand/wrist/elbow.  Right shoulder not tested.  Neurovascularly intact.  Good radial pulse.  Good capillary refill.  Improved ecchymosis over right humerus  No evidence of septic joint      Results Review:  XR Shoulder 2+ View Right  Imaging: shoulder x-rays 2 views - AP and scapular Y x-ray views    Side: RIGHT SHOULDER    Indication for shoulder x-ray 2 views: shoulder " pain    Comparison: post-injury comparison views available    Findings:   RIGHT shoulder greater tuberosity fracture again noted with initial   displacement but stable compared to prior imaging.  Evidence of baseline   inferior pseudosubluxation noted.    I personally reviewed the above x-rays.       XR Shoulder 2+ View Right    Result Date: 5/27/2023  Interval shoulder reduction with anatomic alignment of the glenohumeral joint. Deformity or irregularity superior lateral proximal right humerus.. Subtle? There is some slight cortical irregularity involving the inferior glenoid rim which may reflect nondisplaced fracture. Mild acromioclavicular joint osteoarthrosis. CRITICAL RESULT:   No. COMMUNICATION: Per this written report. Preliminary report signed by Juan Miguel He MD on 5/27/2023 12:04 AM By electronically signing this report, I, the attending physician, attest that I have personally reviewed the images/data for the above examination(s) and agree with the final edited report. Drafted by Juan Miguel He MD on 5/27/2023 12:01 AM Final report signed by Randy Romano MD on 5/27/2023 12:11 AM    XR Shoulder 2+ View Right    Result Date: 5/26/2023  Acute anterior-inferior dislocation of glenohumeral joint. No additional fracture or dislocation of the right upper extremity CRITICAL RESULT:   No. COMMUNICATION: Per this written report. Drafted by Ajay Loredo MD on 5/26/2023 9:19 PM Final report signed by Ajay Loredo MD on 5/26/2023 9:24 PM    XR Forearm 2 View Right    Result Date: 5/26/2023  Acute anterior-inferior dislocation of glenohumeral joint. No additional fracture or dislocation of the right upper extremity CRITICAL RESULT:   No. COMMUNICATION: Per this written report. Drafted by Ajay Loredo MD on 5/26/2023 9:19 PM Final report signed by Ajay Loredo MD on 5/26/2023 9:24 PM    XR Wrist 3+ View Right    Result Date: 5/26/2023  Acute anterior-inferior dislocation of glenohumeral joint. No additional  fracture or dislocation of the right upper extremity CRITICAL RESULT:   No. COMMUNICATION: Per this written report. Drafted by Ajay Loredo MD on 5/26/2023 9:25 PM Final report signed by Ajay Loredo MD on 5/26/2023 9:26 PM    XR Hand 3+ View Right    Result Date: 5/26/2023  Acute anterior-inferior dislocation of glenohumeral joint. No additional fracture or dislocation of the right upper extremity CRITICAL RESULT:   No. COMMUNICATION: Per this written report. Drafted by Ajay Loredo MD on 5/26/2023 9:19 PM Final report signed by Ajay Loredo MD on 5/26/2023 9:24 PM    XR Chest 1 View    Result Date: 5/26/2023  Prominent heart and vessels probably technique related. Inferior dislocated right shoulder. CRITICAL RESULT:   No. COMMUNICATION: Per this written report. Drafted by Randy Romano MD on 5/26/2023 11:42 PM Final report signed by Randy Romano MD on 5/26/2023 11:44 PM    XR Humerus Right 2+ Views    Result Date: 5/26/2023  Acute anterior-inferior dislocation of glenohumeral joint. No additional fracture or dislocation of the right upper extremity CRITICAL RESULT:   No. COMMUNICATION: Per this written report. Drafted by Ajay Loredo MD on 5/26/2023 9:19 PM Final report signed by Ajay Loredo MD on 5/26/2023 9:24 PM    XR Elbows Right 3+ View    Result Date: 5/26/2023  Acute anterior-inferior dislocation of glenohumeral joint. No additional fracture or dislocation of the right upper extremity CRITICAL RESULT:   No. COMMUNICATION: Per this written report. Drafted by Ajay Loredo MD on 5/26/2023 9:19 PM Final report signed by Ajay Loredo MD on 5/26/2023 9:24 PM        Assessment / Plan      Assessment:   Diagnoses and all orders for this visit:    1. Closed nondisplaced fracture of greater tuberosity of right humerus with routine healing, subsequent encounter (Primary)  -     XR Shoulder 2+ View Right  -     Ambulatory Referral to Physical Therapy Evaluate and treat, Ortho  -     ibuprofen (ADVIL,MOTRIN) 600  MG tablet; Take 1 tablet by mouth Every 8 (Eight) Hours As Needed for Mild Pain.  Dispense: 90 tablet; Refill: 1    2. Right shoulder pain, unspecified chronicity  -     Ambulatory Referral to Physical Therapy Evaluate and treat, Ortho          Plan:  X-ray images reviewed with Dr. Lind.  Show stable alignment greater tuberosity fracture on right side.  Patient will continue in sling for the next week.  May start to wean out of sling next week.  She will start physical therapy at that time.  Referral for results of physical therapy.  May start to work on passive range of motion initially.  Avoid lifting with right arm until next visit.  Continue with ibuprofen 600 mg until next visit.    Follow Up:   3 weeks--2+ views prior to next visit        Sara Lawton PA-C  [facility]    Dictated using Dragon Speech Recognition.

## 2023-08-17 ENCOUNTER — OFFICE VISIT (OUTPATIENT)
Dept: ORTHOPEDIC SURGERY | Facility: CLINIC | Age: 68
End: 2023-08-17
Payer: COMMERCIAL

## 2023-08-17 VITALS
HEIGHT: 61 IN | BODY MASS INDEX: 35.95 KG/M2 | WEIGHT: 190.4 LBS | DIASTOLIC BLOOD PRESSURE: 78 MMHG | SYSTOLIC BLOOD PRESSURE: 122 MMHG

## 2023-08-17 DIAGNOSIS — S42.254D CLOSED NONDISPLACED FRACTURE OF GREATER TUBEROSITY OF RIGHT HUMERUS WITH ROUTINE HEALING, SUBSEQUENT ENCOUNTER: Primary | ICD-10-CM

## 2023-08-17 NOTE — PROGRESS NOTES
[facility] Office Follow Up       Office Follow Up Visit       Patient Name: Lizabeth Rangel    Chief Complaint:   Chief Complaint   Patient presents with    Follow-up     6 week follow up; Closed nondisplaced fracture of greater tuberosity of right humerus (DOI: 5/26/23)       Referring Physician: No ref. provider found    History of Present Illness:   Lizabeth Rangel returns to clinic today for follow-up 12 weeks out from greater tuberosity fracture on right side.  Date of injury 5/26/2023.  Fracture due to shoulder dislocation injury. Last visit on 7/6/2023.  She has continued with physical therapy at [facility].  She reports improvement since last visit.  Pain and ROM improving.  Moving to [facility] 1st of October.    7/6/23  She has been in physical therapy for range of motion.  She reports improvements with motion.  Pain varies. Overall improvement however she does experience intense pain with stretching at PT.     [place] Hopeful she will be able to travel without too much difficulty with shoulder.     6/13/23:  She reports mild improvements in pain since last visit.  Taking ibuprofen as needed.  She has been compliant with sling use.  She says bruising has improved.  She has been working on range of motion of right elbow, wrist and hand.     6/1/23  She reports she fell in her kitchen 1 week ago on 5/26/2023 and landed on her right shoulder. She called EMS and they transported her to  ED. She was told she had a dislocation of her right shoulder.  She was reduced in the ED.  Encouraged to wear sling and discharged home.     This is her first dislocation event of the shoulder.     She says pain has improved since day of injury.  Rates current pain a 6/10.  She has been compliant with sling use.  Taking anti-inflammatories as needed for the pain.            Subjective     Review of  "Systems   Constitutional: Negative.    HENT: Negative.     Eyes: Negative.    Respiratory: Negative.     Cardiovascular: Negative.    Gastrointestinal: Negative.    Endocrine: Negative.    Genitourinary: Negative.    Musculoskeletal:  Positive for arthralgias.   Skin: Negative.    Allergic/Immunologic: Negative.    Neurological: Negative.    Hematological: Negative.    Psychiatric/Behavioral: Negative.        I have reviewed and updated the following portions of the patient's history and review of systems: allergies, current medications, past family history, past medical history, past social history, past surgical history and problem list.    Medications:   Current Outpatient Medications:     Cholecalciferol (Vitamin D3) 50 MCG (2000 UT) tablet, Take 1 tablet by mouth Daily., Disp: 90 tablet, Rfl: 3    fexofenadine (ALLEGRA) 60 MG tablet, Take 1 tablet by mouth Daily As Needed., Disp: , Rfl:     fluticasone (FLONASE) 50 MCG/ACT nasal spray, 2 sprays into the nostril(s) as directed by provider Daily As Needed for Allergies., Disp: , Rfl:     ibuprofen (ADVIL,MOTRIN) 600 MG tablet, Take 1 tablet by mouth Every 8 (Eight) Hours As Needed for Mild Pain., Disp: 90 tablet, Rfl: 1    meloxicam (MOBIC) 15 MG tablet, 1 PO Daily with food.  Stop if you have upset stomach/stomach irritation., Disp: 30 tablet, Rfl: 1    methocarbamol (ROBAXIN) 500 MG tablet, , Disp: , Rfl:     vitamin B-12 (CYANOCOBALAMIN) 1000 MCG tablet, Take 1 tablet by mouth Daily., Disp: 90 tablet, Rfl: 3    Allergies:   Allergies   Allergen Reactions    Thimerosal Itching     Itchy red eyes          Objective      Vital Signs:   Vitals:    08/17/23 0824   BP: 122/78   Weight: 86.4 kg (190 lb 6.4 oz)   Height: 154.9 cm (61\")       Ortho Exam:  General: no acute distress, comfortable  Vitals reviewed in chart    Musculoskeletal Exam:    SIDE: Right shoulder    Tenderness: Rotator cuff    Range of motion measurements (degrees): 140/140/40/30  Painful arc of " motion: Pain with peak motion  Able to hold Yamini's  Negative lag sign  No skin changes  No evidence of septic joint      Results Review:  XR Shoulder 2+ View Right  Imaging: shoulder x-rays 3 views - AP, axillary, and scapular-Y x-ray   views    Side: RIGHT SHOULDER    Indication for shoulder x-ray 3 views: shoulder pain    Comparison: Post injury comparison views available    Findings:   Right shoulder known greater tuberosity fracture with fracture healing   noted.  Stable positioning compared to prior post injury serial images.    I personally reviewed the above x-rays.       Assessment / Plan      Assessment:   Diagnoses and all orders for this visit:    1. Closed nondisplaced fracture of greater tuberosity of right humerus with routine healing, subsequent encounter (Primary)  -     XR Shoulder 2+ View Right        Plan:  Doing well 12 weeks out from greater tuberosity fracture from shoulder dislocation event.  X-ray films reviewed today with Dr. Lind.  Shows healing of greater tuberosity fracture with improvement of pseudosubluxation.  Improvements with range of motion and pain since last visit.  She will continue with physical therapy.  May progress with gradual strengthening as tolerated.  She will be moving to [facility] soon.  She will keep us updated if any issues arise prior to her move.  May take anti-inflammatories as needed.    Follow Up:   Return if symptoms worsen or fail to improve.        Sara Lawton PA-C  [facility] [place] [facility]    Dictated using Dragon Speech Recognition.

## 2023-08-21 ENCOUNTER — LAB (OUTPATIENT)
Dept: LAB | Facility: HOSPITAL | Age: 68
End: 2023-08-21
Payer: COMMERCIAL

## 2023-08-21 ENCOUNTER — TELEPHONE (OUTPATIENT)
Dept: INTERNAL MEDICINE | Facility: CLINIC | Age: 68
End: 2023-08-21

## 2023-08-21 DIAGNOSIS — E78.2 MIXED HYPERLIPIDEMIA: Primary | ICD-10-CM

## 2023-08-21 DIAGNOSIS — E55.9 VITAMIN D DEFICIENCY: ICD-10-CM

## 2023-08-21 DIAGNOSIS — E78.2 MIXED HYPERLIPIDEMIA: ICD-10-CM

## 2023-08-21 DIAGNOSIS — R22.1 NECK SWELLING: ICD-10-CM

## 2023-08-21 DIAGNOSIS — E53.8 B12 DEFICIENCY: ICD-10-CM

## 2023-08-21 LAB
BASOPHILS # BLD AUTO: 0.06 10*3/MM3 (ref 0–0.2)
BASOPHILS NFR BLD AUTO: 1.2 % (ref 0–1.5)
DEPRECATED RDW RBC AUTO: 38.7 FL (ref 37–54)
EOSINOPHIL # BLD AUTO: 0.06 10*3/MM3 (ref 0–0.4)
EOSINOPHIL NFR BLD AUTO: 1.2 % (ref 0.3–6.2)
ERYTHROCYTE [DISTWIDTH] IN BLOOD BY AUTOMATED COUNT: 12.1 % (ref 12.3–15.4)
HCT VFR BLD AUTO: 42.1 % (ref 34–46.6)
HGB BLD-MCNC: 14.2 G/DL (ref 12–15.9)
IMM GRANULOCYTES # BLD AUTO: 0.01 10*3/MM3 (ref 0–0.05)
IMM GRANULOCYTES NFR BLD AUTO: 0.2 % (ref 0–0.5)
LYMPHOCYTES # BLD AUTO: 1.51 10*3/MM3 (ref 0.7–3.1)
LYMPHOCYTES NFR BLD AUTO: 30.3 % (ref 19.6–45.3)
MCH RBC QN AUTO: 29.8 PG (ref 26.6–33)
MCHC RBC AUTO-ENTMCNC: 33.7 G/DL (ref 31.5–35.7)
MCV RBC AUTO: 88.3 FL (ref 79–97)
MONOCYTES # BLD AUTO: 0.48 10*3/MM3 (ref 0.1–0.9)
MONOCYTES NFR BLD AUTO: 9.6 % (ref 5–12)
NEUTROPHILS NFR BLD AUTO: 2.86 10*3/MM3 (ref 1.7–7)
NEUTROPHILS NFR BLD AUTO: 57.5 % (ref 42.7–76)
NRBC BLD AUTO-RTO: 0 /100 WBC (ref 0–0.2)
PLATELET # BLD AUTO: 214 10*3/MM3 (ref 140–450)
PMV BLD AUTO: 11.2 FL (ref 6–12)
RBC # BLD AUTO: 4.77 10*6/MM3 (ref 3.77–5.28)
T3FREE SERPL-MCNC: 2.82 PG/ML (ref 2–4.4)
T4 FREE SERPL-MCNC: 1.25 NG/DL (ref 0.93–1.7)
TSH SERPL DL<=0.05 MIU/L-ACNC: 2.21 UIU/ML (ref 0.27–4.2)
WBC NRBC COR # BLD: 4.98 10*3/MM3 (ref 3.4–10.8)

## 2023-08-21 PROCEDURE — 84439 ASSAY OF FREE THYROXINE: CPT

## 2023-08-21 PROCEDURE — 85652 RBC SED RATE AUTOMATED: CPT

## 2023-08-21 PROCEDURE — 80061 LIPID PANEL: CPT

## 2023-08-21 PROCEDURE — 84443 ASSAY THYROID STIM HORMONE: CPT

## 2023-08-21 PROCEDURE — 84481 FREE ASSAY (FT-3): CPT

## 2023-08-21 PROCEDURE — 85025 COMPLETE CBC W/AUTO DIFF WBC: CPT

## 2023-08-21 PROCEDURE — 80053 COMPREHEN METABOLIC PANEL: CPT

## 2023-08-21 NOTE — TELEPHONE ENCOUNTER
Looks like she also needs CMP, B12 and D.  If they can add the B12 and D as well that would be great.  It allowed me to add lipids and cmp to orders.

## 2023-08-21 NOTE — TELEPHONE ENCOUNTER
PLEASE CALL PATIENT BACK WHEN THE ORDERS HAVE BEEN PLACED SO SHE CAN FAST APPROPRIATELY FOR HER LABS.     PHONE NUMBER: [phone]

## 2023-08-21 NOTE — TELEPHONE ENCOUNTER
"    Caller: Lizabeth Rangel \"Shireen\"    Relationship: Self    Best call back number: [phone]    What orders are you requesting (i.e. lab or imaging): NNI LIPID PROFILE    In what timeframe would the patient need to come in: ASAP    Where will you receive your lab/imaging services: [facility] LOCATION    Additional notes: REQUESTING THIS TO BE ADDED TO CURRENT ORDERS    "

## 2023-08-22 LAB
ALBUMIN SERPL-MCNC: 3.5 G/DL (ref 3.5–5.2)
ALBUMIN/GLOB SERPL: 1.1 G/DL
ALP SERPL-CCNC: 96 U/L (ref 39–117)
ALT SERPL W P-5'-P-CCNC: 18 U/L (ref 1–33)
ANION GAP SERPL CALCULATED.3IONS-SCNC: 13.8 MMOL/L (ref 5–15)
AST SERPL-CCNC: 17 U/L (ref 1–32)
BILIRUB SERPL-MCNC: 0.5 MG/DL (ref 0–1.2)
BUN SERPL-MCNC: 9 MG/DL (ref 8–23)
BUN/CREAT SERPL: 11.8 (ref 7–25)
CALCIUM SPEC-SCNC: 9.2 MG/DL (ref 8.6–10.5)
CHLORIDE SERPL-SCNC: 103 MMOL/L (ref 98–107)
CHOLEST SERPL-MCNC: 225 MG/DL (ref 0–200)
CO2 SERPL-SCNC: 21.2 MMOL/L (ref 22–29)
CREAT SERPL-MCNC: 0.76 MG/DL (ref 0.57–1)
EGFRCR SERPLBLD CKD-EPI 2021: 85.5 ML/MIN/1.73
ERYTHROCYTE [SEDIMENTATION RATE] IN BLOOD: 26 MM/HR (ref 0–30)
GLOBULIN UR ELPH-MCNC: 3.3 GM/DL
GLUCOSE SERPL-MCNC: 94 MG/DL (ref 65–99)
HDLC SERPL-MCNC: 60 MG/DL (ref 40–60)
LDLC SERPL CALC-MCNC: 144 MG/DL (ref 0–100)
LDLC/HDLC SERPL: 2.35 {RATIO}
POTASSIUM SERPL-SCNC: 4.6 MMOL/L (ref 3.5–5.2)
PROT SERPL-MCNC: 6.8 G/DL (ref 6–8.5)
SODIUM SERPL-SCNC: 138 MMOL/L (ref 136–145)
TRIGL SERPL-MCNC: 119 MG/DL (ref 0–150)
VLDLC SERPL-MCNC: 21 MG/DL (ref 5–40)

## 2023-08-23 ENCOUNTER — OFFICE VISIT (OUTPATIENT)
Dept: INTERNAL MEDICINE | Facility: CLINIC | Age: 68
End: 2023-08-23
Payer: COMMERCIAL

## 2023-08-23 VITALS
BODY MASS INDEX: 36.32 KG/M2 | HEART RATE: 100 BPM | SYSTOLIC BLOOD PRESSURE: 138 MMHG | TEMPERATURE: 97.7 F | OXYGEN SATURATION: 98 % | HEIGHT: 61 IN | DIASTOLIC BLOOD PRESSURE: 72 MMHG | WEIGHT: 192.4 LBS

## 2023-08-23 DIAGNOSIS — E78.2 MIXED HYPERLIPIDEMIA: Primary | ICD-10-CM

## 2023-08-23 DIAGNOSIS — E66.01 MORBID (SEVERE) OBESITY DUE TO EXCESS CALORIES: ICD-10-CM

## 2023-08-24 DIAGNOSIS — S42.254D CLOSED NONDISPLACED FRACTURE OF GREATER TUBEROSITY OF RIGHT HUMERUS WITH ROUTINE HEALING, SUBSEQUENT ENCOUNTER: ICD-10-CM

## 2023-08-24 RX ORDER — MELOXICAM 15 MG/1
TABLET ORAL
Qty: 30 TABLET | Refills: 1 | Status: SHIPPED | OUTPATIENT
Start: 2023-08-24

## 2023-08-24 NOTE — PROGRESS NOTES
"Lizabeth Rangel  1955  [id number]  Patient Care Team:  Holli Kaur APRN as PCP - General (Internal Medicine)  Gary Hamilton MD as Consulting Physician (Pulmonary Disease)    Lizabeth Rangel is a pleasant 67 y.o. female who presents for evaluation of Hyperlipidemia    Chief Complaint   Patient presents with    Hyperlipidemia       HPI:   Lizabeth \"Shireen\" Juan Carlos is a 65-year-old female who presents for routine 6-month follow-up.    The patient has lost a significant amount of weight since her last visit. She has been eating whole foods, no sugar, and occasionally mashed potatoes with butter and broccoli. Her highest weight was 250 pounds in 2015. She lost some weight and stayed in the 220 to 230 pounds. She walks her dog every morning and uses her treadmill for 30 minutes daily. Her energy level is good. Her LDL is 144 mg/dl. Her LDL was 181 mg/dl. She was not taking any medications for her cholesterol. She is interested in a NMR lab to further evaluate particle size in the context of hyperlipidemia. She is in physical therapy recovering from a right shoulder fracture/dislocation.She takes Tylenol as needed for pain. She is no longer taking methocarbamol or meloxicam. She takes 2 Tylenol. Her pain only last 1 or 2 hours and then it is gone.    She will have her pneumonia vaccination today. She has had her shingles vaccine.    The patient's last colonoscopy was in 2015 or 2016.    The patient denies any palpitations, irregular heartbeat, or lower extremity edema.     Past Medical History:   Diagnosis Date    Allergic rhinitis     Ankle fracture 1994 1994 (R) Ankle ORIF and 1998 (L) Ankle ORIF    Breast injury 08/26/2020    fell 3 weeks ago    Gallstone pancreatitis 1997    Perforated bowel 1997    Sleep apnea      Past Surgical History:   Procedure Laterality Date    ANKLE OPEN REDUCTION INTERNAL FIXATION      1994 (R) Ankle ORIF and 1998 (L) Ankle ORIF    CATARACT EXTRACTION  2018    " "CATARACT EXTRACTION  2017    CHOLECYSTECTOMY      COLON RESECTION      COLON RESECTION      bowel perforation repair    ENDOSCOPY      Gastro issue     Family History   Problem Relation Age of Onset    Obesity Mother     Alcohol abuse Father     Obesity Father     Breast cancer Maternal Aunt 30    Ovarian cancer Neg Hx     Endometrial cancer Neg Hx      Social History     Tobacco Use   Smoking Status Former    Packs/day: 2.00    Years: 40.00    Pack years: 80.00    Types: Cigarettes    Quit date: 1989    Years since quittin.1   Smokeless Tobacco Never     Allergies   Allergen Reactions    Thimerosal Itching     Itchy red eyes        Current Outpatient Medications:     Cholecalciferol (Vitamin D3) 50 MCG (2000 UT) tablet, Take 1 tablet by mouth Daily., Disp: 90 tablet, Rfl: 3    fexofenadine (ALLEGRA) 60 MG tablet, Take 1 tablet by mouth Daily As Needed., Disp: , Rfl:     fluticasone (FLONASE) 50 MCG/ACT nasal spray, 2 sprays into the nostril(s) as directed by provider Daily As Needed for Allergies., Disp: , Rfl:     ibuprofen (ADVIL,MOTRIN) 600 MG tablet, Take 1 tablet by mouth Every 8 (Eight) Hours As Needed for Mild Pain., Disp: 90 tablet, Rfl: 1    vitamin B-12 (CYANOCOBALAMIN) 1000 MCG tablet, Take 1 tablet by mouth Daily., Disp: 90 tablet, Rfl: 3    meloxicam (MOBIC) 15 MG tablet, TAKE 1 TABLET BY MOUTH DAILY WITH FOOD. STOP IF YOU HAVE UPSET STOMACH/ STOMACH IRRITATION, Disp: 30 tablet, Rfl: 1    Review of Systems  Review of systems was completed, and pertinent findings are noted in the HPI.  /72 (BP Location: Left arm, Patient Position: Sitting, Cuff Size: Adult)   Pulse 100   Temp 97.7 øF (36.5 øC) (Infrared)   Ht 154.9 cm (60.98\")   Wt 87.3 kg (192 lb 6.4 oz)   SpO2 98%   BMI 36.37 kg/mý     Physical Exam  Constitutional:       Appearance: She is well-developed. She is morbidly obese.   HENT:      Head: Normocephalic and atraumatic.   Eyes:      Conjunctiva/sclera: Conjunctivae " normal.      Pupils: Pupils are equal, round, and reactive to light.   Cardiovascular:      Rate and Rhythm: Normal rate and regular rhythm.      Pulses: Normal pulses.      Heart sounds: Normal heart sounds.   Pulmonary:      Effort: Pulmonary effort is normal.      Breath sounds: Normal breath sounds.   Musculoskeletal:         General: Normal range of motion.      Cervical back: Normal range of motion and neck supple.      Right lower leg: No edema.      Left lower leg: No edema.   Skin:     General: Skin is warm and dry.   Neurological:      Mental Status: She is alert and oriented to person, place, and time.   Psychiatric:         Mood and Affect: Mood normal.         Behavior: Behavior normal.         Thought Content: Thought content normal.         Judgment: Judgment normal.       PHQ-9 Total Score:      Assessment/Plan:  Diagnoses and all orders for this visit:    1. Mixed hyperlipidemia (Primary)  -     NMR LipoProfile; Future    2. Morbid (severe) obesity due to excess calories  -     Hemoglobin A1c; Future    Other orders  -     Pneumococcal Conjugate Vaccine 20-Valent All     1. Health maintenance  Comments:  - The patient will receive her pneumonia vaccine today.    Labs  -Ordered NMR, D, B12, A1c.  - Will notify her of the lab results.        There are no Patient Instructions on file for this visit.  Plan of care reviewed with patient at the conclusion of today's visit. Education was provided regarding diagnosis, management and any prescribed or recommended OTC medications.  Patient verbalizes understanding of and agreement with management plan.    Return in about 6 months (around 2/23/2024) for Annual physical.    Dictated Utilizing Dragon Dictation.    VALDEZ Rai      Transcribed from ambient dictation for VALDEZ Rai by Parminder Rios  08/23/23   21:11 EDT    Patient or patient representative verbalized consent to the visit recording.  I have personally performed the  services described in this document as transcribed by the above individual, and it is both accurate and complete.

## 2023-10-16 ENCOUNTER — LAB (OUTPATIENT)
Dept: LAB | Facility: HOSPITAL | Age: 68
End: 2023-10-16
Payer: COMMERCIAL

## 2023-10-16 DIAGNOSIS — E66.01 MORBID (SEVERE) OBESITY DUE TO EXCESS CALORIES: ICD-10-CM

## 2023-10-16 DIAGNOSIS — E55.9 VITAMIN D DEFICIENCY: ICD-10-CM

## 2023-10-16 DIAGNOSIS — E53.8 B12 DEFICIENCY: ICD-10-CM

## 2023-10-16 DIAGNOSIS — E78.2 MIXED HYPERLIPIDEMIA: ICD-10-CM

## 2023-10-16 LAB
25(OH)D3 SERPL-MCNC: 38.4 NG/ML (ref 30–100)
HBA1C MFR BLD: 5.5 % (ref 4.8–5.6)
VIT B12 BLD-MCNC: 1019 PG/ML (ref 211–946)

## 2023-10-16 PROCEDURE — 82306 VITAMIN D 25 HYDROXY: CPT

## 2023-10-16 PROCEDURE — 82607 VITAMIN B-12: CPT

## 2023-10-16 PROCEDURE — 83704 LIPOPROTEIN BLD QUAN PART: CPT

## 2023-10-16 PROCEDURE — 83036 HEMOGLOBIN GLYCOSYLATED A1C: CPT

## 2023-10-16 PROCEDURE — 80061 LIPID PANEL: CPT

## 2023-10-17 LAB
CHOLEST SERPL-MCNC: 244 MG/DL (ref 100–199)
HDL SERPL-SCNC: 37.8 UMOL/L
HDLC SERPL-MCNC: 63 MG/DL
LDL SERPL QN: 22 NM
LDL SERPL-SCNC: 1673 NMOL/L
LDL SMALL SERPL-SCNC: 275 NMOL/L
LDLC SERPL CALC-MCNC: 155 MG/DL (ref 0–99)
TRIGL SERPL-MCNC: 147 MG/DL (ref 0–149)